# Patient Record
Sex: MALE | Race: WHITE | Employment: FULL TIME | URBAN - METROPOLITAN AREA
[De-identification: names, ages, dates, MRNs, and addresses within clinical notes are randomized per-mention and may not be internally consistent; named-entity substitution may affect disease eponyms.]

---

## 2017-01-23 RX ORDER — LISINOPRIL 40 MG/1
TABLET ORAL
Qty: 90 TABLET | Refills: 0 | Status: SHIPPED | OUTPATIENT
Start: 2017-01-23 | End: 2017-04-22

## 2017-04-22 RX ORDER — LISINOPRIL 40 MG/1
TABLET ORAL
Qty: 90 TABLET | Refills: 1 | Status: SHIPPED | OUTPATIENT
Start: 2017-04-22 | End: 2017-10-31

## 2017-05-25 ENCOUNTER — TELEPHONE (OUTPATIENT)
Dept: INTERNAL MEDICINE CLINIC | Facility: CLINIC | Age: 47
End: 2017-05-25

## 2017-05-25 DIAGNOSIS — Z00.00 ENCOUNTER FOR PREVENTATIVE ADULT HEALTH CARE EXAMINATION: Primary | ICD-10-CM

## 2017-05-25 NOTE — TELEPHONE ENCOUNTER
Patient has upcoming appointment for CPX  Future Appointments  Date Time Provider Vanessa Lock   6/1/2017 1:30 PM Charlie Hernandez MD [facility] [place]     Requesting to have blood orders entered prior to appointment  Please call with update

## 2017-05-25 NOTE — TELEPHONE ENCOUNTER
Orders placed, should get the blood work done 2-3 days before appointment, needs to be fasting for 8 hours (water and medications only).

## 2017-05-26 ENCOUNTER — APPOINTMENT (OUTPATIENT)
Dept: LAB | Age: 47
End: 2017-05-26
Attending: INTERNAL MEDICINE
Payer: COMMERCIAL

## 2017-05-26 DIAGNOSIS — Z00.00 ENCOUNTER FOR PREVENTATIVE ADULT HEALTH CARE EXAMINATION: ICD-10-CM

## 2017-05-26 PROCEDURE — 84153 ASSAY OF PSA TOTAL: CPT | Performed by: INTERNAL MEDICINE

## 2017-05-26 PROCEDURE — 80061 LIPID PANEL: CPT | Performed by: INTERNAL MEDICINE

## 2017-05-26 PROCEDURE — 80053 COMPREHEN METABOLIC PANEL: CPT | Performed by: INTERNAL MEDICINE

## 2017-05-26 PROCEDURE — 36415 COLL VENOUS BLD VENIPUNCTURE: CPT | Performed by: INTERNAL MEDICINE

## 2017-05-26 PROCEDURE — 83036 HEMOGLOBIN GLYCOSYLATED A1C: CPT | Performed by: INTERNAL MEDICINE

## 2017-06-01 ENCOUNTER — OFFICE VISIT (OUTPATIENT)
Dept: INTERNAL MEDICINE CLINIC | Facility: CLINIC | Age: 47
End: 2017-06-01

## 2017-06-01 VITALS
SYSTOLIC BLOOD PRESSURE: 124 MMHG | BODY MASS INDEX: 39.82 KG/M2 | HEIGHT: 72 IN | TEMPERATURE: 98 F | RESPIRATION RATE: 12 BRPM | DIASTOLIC BLOOD PRESSURE: 78 MMHG | HEART RATE: 80 BPM | WEIGHT: 294 LBS

## 2017-06-01 DIAGNOSIS — Z00.00 ENCOUNTER FOR PREVENTIVE HEALTH EXAMINATION: Primary | ICD-10-CM

## 2017-06-01 DIAGNOSIS — I10 ESSENTIAL HYPERTENSION: ICD-10-CM

## 2017-06-01 PROCEDURE — 99396 PREV VISIT EST AGE 40-64: CPT | Performed by: INTERNAL MEDICINE

## 2017-06-01 NOTE — PROGRESS NOTES
Krista Palacios is a 40year old male. HPI:   Patient presents with:  Physical  Patient presents for CPX/wellness examination. Had screening blood work done earlier in the week. Reasonable numbers. Admits to lapsing as far as diet and exercise.   Yogi mmHg  Pulse 80  Temp(Src) 98.2 °F (36.8 °C) (Oral)  Resp 12  Ht 72\"  Wt 294 lb  BMI 39.86 kg/m2  GENERAL: Alert and oriented, well developed, well nourished,in no apparent distress  HEENT: atraumatic, PERRLA, EOMI, normal lid and conjunctiva  LUNGS: clear

## 2017-06-01 NOTE — PATIENT INSTRUCTIONS
- Continue with lisinopril for blood pressure  - Try and cut down on dairy/starches to see if this helps with your bloating  - Follow up with ENT (Dr. Gregorio Greer) for your tonsils/sinuses. Follow up with us in 6 months for blood pressure.     It was a pleasu

## 2017-11-01 RX ORDER — LISINOPRIL 40 MG/1
TABLET ORAL
Qty: 90 TABLET | Refills: 1 | Status: SHIPPED | OUTPATIENT
Start: 2017-11-01 | End: 2018-05-03

## 2017-12-20 ENCOUNTER — CHARTING TRANS (OUTPATIENT)
Dept: OCCUPATIONAL MEDICINE | Age: 47
End: 2017-12-20

## 2017-12-27 ENCOUNTER — CHARTING TRANS (OUTPATIENT)
Dept: OCCUPATIONAL MEDICINE | Age: 47
End: 2017-12-27

## 2017-12-27 ENCOUNTER — TELEPHONE (OUTPATIENT)
Dept: INTERNAL MEDICINE CLINIC | Facility: CLINIC | Age: 47
End: 2017-12-27

## 2017-12-27 NOTE — TELEPHONE ENCOUNTER
Patient called b/p this AM @ work 152/90, patient denies any SOB,Headache,double vision or edema. Stress from Holidays? Patient offered several appointment but due to work schedule unable to accept, patient scheduled appointment for 1/6/18.  Patient instruc

## 2018-01-03 ENCOUNTER — CHARTING TRANS (OUTPATIENT)
Dept: OCCUPATIONAL MEDICINE | Age: 48
End: 2018-01-03

## 2018-01-06 ENCOUNTER — OFFICE VISIT (OUTPATIENT)
Dept: INTERNAL MEDICINE CLINIC | Facility: CLINIC | Age: 48
End: 2018-01-06

## 2018-01-06 VITALS
RESPIRATION RATE: 13 BRPM | DIASTOLIC BLOOD PRESSURE: 78 MMHG | TEMPERATURE: 98 F | OXYGEN SATURATION: 98 % | WEIGHT: 293 LBS | BODY MASS INDEX: 39.68 KG/M2 | HEART RATE: 64 BPM | SYSTOLIC BLOOD PRESSURE: 126 MMHG | HEIGHT: 72 IN

## 2018-01-06 DIAGNOSIS — E66.01 SEVERE OBESITY (BMI 35.0-39.9) WITH COMORBIDITY (HCC): ICD-10-CM

## 2018-01-06 DIAGNOSIS — M25.562 CHRONIC PAIN OF BOTH KNEES: ICD-10-CM

## 2018-01-06 DIAGNOSIS — M25.561 CHRONIC PAIN OF BOTH KNEES: ICD-10-CM

## 2018-01-06 DIAGNOSIS — I10 ESSENTIAL HYPERTENSION: Primary | ICD-10-CM

## 2018-01-06 DIAGNOSIS — G89.29 CHRONIC PAIN OF BOTH KNEES: ICD-10-CM

## 2018-01-06 PROCEDURE — 99214 OFFICE O/P EST MOD 30 MIN: CPT | Performed by: INTERNAL MEDICINE

## 2018-01-06 NOTE — PROGRESS NOTES
James Urban is a 56year old male. HPI:   Patient presents with: Follow - Up  Blood Pressure  Patient presents for follow up on chronic issues. He had an elevated blood pressure reading at work last month (825'N systolic).   He was under some stres Right arm, Patient Position: Sitting, Cuff Size: adult)   Pulse 64   Temp 98.3 °F (36.8 °C) (Oral)   Resp 13   Ht 72\"   Wt 293 lb   SpO2 98%   BMI 39.74 kg/m²   GENERAL: Alert and oriented, well developed, well nourished,in no apparent distress  HEENT: at

## 2018-01-06 NOTE — PATIENT INSTRUCTIONS
- We will continue your current dose of lisinopril. - Try and check your blood pressure in the evenings. If you are getting persistent blood pressures in the 150's, let us know. It was a pleasure seeing you in the clinic today.   Thank you for Button

## 2018-05-03 RX ORDER — LISINOPRIL 40 MG/1
TABLET ORAL
Qty: 90 TABLET | Refills: 0 | Status: SHIPPED | OUTPATIENT
Start: 2018-05-03 | End: 2018-08-02

## 2018-05-03 NOTE — TELEPHONE ENCOUNTER
Lisinopril 40 mg 1 tab daily filled 11-1-17 90 with 1 refill     LOV 1-6-18      return to clinic in 6 months     Labs 5-26-17     Normal/acceptable screening labs, repeat in 1 year.

## 2018-08-02 RX ORDER — LISINOPRIL 40 MG/1
TABLET ORAL
Qty: 90 TABLET | Refills: 0 | Status: SHIPPED | OUTPATIENT
Start: 2018-08-02 | End: 2018-08-16

## 2018-08-16 ENCOUNTER — OFFICE VISIT (OUTPATIENT)
Dept: INTERNAL MEDICINE CLINIC | Facility: CLINIC | Age: 48
End: 2018-08-16
Payer: COMMERCIAL

## 2018-08-16 ENCOUNTER — LAB ENCOUNTER (OUTPATIENT)
Dept: LAB | Age: 48
End: 2018-08-16
Attending: INTERNAL MEDICINE
Payer: COMMERCIAL

## 2018-08-16 VITALS
DIASTOLIC BLOOD PRESSURE: 72 MMHG | RESPIRATION RATE: 18 BRPM | TEMPERATURE: 99 F | BODY MASS INDEX: 32.7 KG/M2 | WEIGHT: 238.75 LBS | HEIGHT: 71.5 IN | OXYGEN SATURATION: 97 % | HEART RATE: 64 BPM | SYSTOLIC BLOOD PRESSURE: 118 MMHG

## 2018-08-16 DIAGNOSIS — G89.29 CHRONIC PAIN OF BOTH KNEES: ICD-10-CM

## 2018-08-16 DIAGNOSIS — M25.561 CHRONIC PAIN OF BOTH KNEES: ICD-10-CM

## 2018-08-16 DIAGNOSIS — G89.29 CHRONIC RIGHT-SIDED THORACIC BACK PAIN: ICD-10-CM

## 2018-08-16 DIAGNOSIS — Z00.00 ENCOUNTER FOR PREVENTATIVE ADULT HEALTH CARE EXAMINATION: ICD-10-CM

## 2018-08-16 DIAGNOSIS — M25.562 CHRONIC PAIN OF BOTH KNEES: ICD-10-CM

## 2018-08-16 DIAGNOSIS — Z00.00 ENCOUNTER FOR PREVENTATIVE ADULT HEALTH CARE EXAMINATION: Primary | ICD-10-CM

## 2018-08-16 DIAGNOSIS — M54.6 CHRONIC RIGHT-SIDED THORACIC BACK PAIN: ICD-10-CM

## 2018-08-16 DIAGNOSIS — I10 ESSENTIAL HYPERTENSION: ICD-10-CM

## 2018-08-16 LAB
ALBUMIN SERPL-MCNC: 4 G/DL (ref 3.5–4.8)
ALBUMIN/GLOB SERPL: 1.2 {RATIO} (ref 1–2)
ALP LIVER SERPL-CCNC: 61 U/L (ref 45–117)
ALT SERPL-CCNC: 25 U/L (ref 17–63)
ANION GAP SERPL CALC-SCNC: 7 MMOL/L (ref 0–18)
AST SERPL-CCNC: 18 U/L (ref 15–41)
BASOPHILS # BLD AUTO: 0.03 X10(3) UL (ref 0–0.1)
BASOPHILS NFR BLD AUTO: 0.6 %
BILIRUB SERPL-MCNC: 0.6 MG/DL (ref 0.1–2)
BUN BLD-MCNC: 12 MG/DL (ref 8–20)
BUN/CREAT SERPL: 12 (ref 10–20)
CALCIUM BLD-MCNC: 9 MG/DL (ref 8.3–10.3)
CHLORIDE SERPL-SCNC: 109 MMOL/L (ref 101–111)
CHOLEST SMN-MCNC: 156 MG/DL (ref ?–200)
CO2 SERPL-SCNC: 27 MMOL/L (ref 22–32)
COMPLEXED PSA SERPL-MCNC: 0.65 NG/ML (ref 0.01–4)
CREAT BLD-MCNC: 1 MG/DL (ref 0.7–1.3)
EOSINOPHIL # BLD AUTO: 0.09 X10(3) UL (ref 0–0.3)
EOSINOPHIL NFR BLD AUTO: 1.8 %
ERYTHROCYTE [DISTWIDTH] IN BLOOD BY AUTOMATED COUNT: 14 % (ref 11.5–16)
EST. AVERAGE GLUCOSE BLD GHB EST-MCNC: 108 MG/DL (ref 68–126)
GLOBULIN PLAS-MCNC: 3.3 G/DL (ref 2.5–4)
GLUCOSE BLD-MCNC: 93 MG/DL (ref 70–99)
HBA1C MFR BLD HPLC: 5.4 % (ref ?–5.7)
HCT VFR BLD AUTO: 43.4 % (ref 37–53)
HDLC SERPL-MCNC: 50 MG/DL (ref 40–59)
HGB BLD-MCNC: 14 G/DL (ref 13–17)
IMMATURE GRANULOCYTE COUNT: 0.01 X10(3) UL (ref 0–1)
IMMATURE GRANULOCYTE RATIO %: 0.2 %
LDLC SERPL CALC-MCNC: 96 MG/DL (ref ?–100)
LYMPHOCYTES # BLD AUTO: 1.74 X10(3) UL (ref 0.9–4)
LYMPHOCYTES NFR BLD AUTO: 35.4 %
M PROTEIN MFR SERPL ELPH: 7.3 G/DL (ref 6.1–8.3)
MCH RBC QN AUTO: 30.4 PG (ref 27–33.2)
MCHC RBC AUTO-ENTMCNC: 32.3 G/DL (ref 31–37)
MCV RBC AUTO: 94.3 FL (ref 80–99)
MONOCYTES # BLD AUTO: 0.4 X10(3) UL (ref 0.1–1)
MONOCYTES NFR BLD AUTO: 8.1 %
NEUTROPHIL ABS PRELIM: 2.65 X10 (3) UL (ref 1.3–6.7)
NEUTROPHILS # BLD AUTO: 2.65 X10(3) UL (ref 1.3–6.7)
NEUTROPHILS NFR BLD AUTO: 53.9 %
NONHDLC SERPL-MCNC: 106 MG/DL (ref ?–130)
OSMOLALITY SERPL CALC.SUM OF ELEC: 295 MOSM/KG (ref 275–295)
PLATELET # BLD AUTO: 222 10(3)UL (ref 150–450)
POTASSIUM SERPL-SCNC: 4.9 MMOL/L (ref 3.6–5.1)
RBC # BLD AUTO: 4.6 X10(6)UL (ref 4.3–5.7)
RED CELL DISTRIBUTION WIDTH-SD: 48.4 FL (ref 35.1–46.3)
RHEUMATOID FACT SERPL-ACNC: <10 IU/ML (ref ?–15)
SODIUM SERPL-SCNC: 143 MMOL/L (ref 136–144)
TRIGL SERPL-MCNC: 48 MG/DL (ref 30–149)
TSI SER-ACNC: 0.6 MIU/ML (ref 0.35–5.5)
VLDLC SERPL CALC-MCNC: 10 MG/DL (ref 0–30)
WBC # BLD AUTO: 4.9 X10(3) UL (ref 4–13)

## 2018-08-16 PROCEDURE — 86038 ANTINUCLEAR ANTIBODIES: CPT | Performed by: INTERNAL MEDICINE

## 2018-08-16 PROCEDURE — 80050 GENERAL HEALTH PANEL: CPT | Performed by: INTERNAL MEDICINE

## 2018-08-16 PROCEDURE — 83036 HEMOGLOBIN GLYCOSYLATED A1C: CPT | Performed by: INTERNAL MEDICINE

## 2018-08-16 PROCEDURE — 99396 PREV VISIT EST AGE 40-64: CPT | Performed by: INTERNAL MEDICINE

## 2018-08-16 PROCEDURE — 86431 RHEUMATOID FACTOR QUANT: CPT | Performed by: INTERNAL MEDICINE

## 2018-08-16 PROCEDURE — 80061 LIPID PANEL: CPT | Performed by: INTERNAL MEDICINE

## 2018-08-16 PROCEDURE — 86200 CCP ANTIBODY: CPT | Performed by: INTERNAL MEDICINE

## 2018-08-16 PROCEDURE — 36415 COLL VENOUS BLD VENIPUNCTURE: CPT | Performed by: INTERNAL MEDICINE

## 2018-08-16 PROCEDURE — 84153 ASSAY OF PSA TOTAL: CPT | Performed by: INTERNAL MEDICINE

## 2018-08-16 RX ORDER — NAPROXEN 250 MG/1
250 TABLET ORAL DAILY PRN
COMMUNITY
End: 2019-11-22 | Stop reason: ALTCHOICE

## 2018-08-16 NOTE — PROGRESS NOTES
Corbin Mock is a 56year old male. HPI:   Patient presents with:  Physical: Fasting  Blood Pressure: Pt dc'd Lisinopril - Lost 60+ lbs and notice bp was getting low. Was feeling lightheaded. Patient presents for CPX/wellness exanation.   Diet:  A lot daily as needed. , Disp: 45 tablet, Rfl: 0  •  IBUPROFEN 800 MG Oral Tab, Take 1 tablet (800 mg total) by mouth 3 (three) times daily.  (Patient taking differently: Take 800 mg by mouth daily as needed.  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 Advised to work it in on the weekend/when he is off work. 2.  Essential hypertension  After his significant weight loss, started feeling dizzy/lightheaded.   Checked blood pressure and found he

## 2018-08-16 NOTE — PATIENT INSTRUCTIONS
- Get blood work done today  - If your autoimmune tests are positive, we will have you see a rheumatologist.  Otherwise, you can follow up with Orthopedics (Dr. Liana Gtz).    - If your knee starts swelling again, you can take the prescription anti-inflammatory

## 2018-08-17 LAB — ANA SCREEN: NEGATIVE

## 2018-08-18 LAB — CYCLIC CITRULLINATED PEPTIDE: 5 UNITS

## 2018-11-27 VITALS
RESPIRATION RATE: 16 BRPM | HEART RATE: 71 BPM | TEMPERATURE: 98.2 F | SYSTOLIC BLOOD PRESSURE: 141 MMHG | DIASTOLIC BLOOD PRESSURE: 87 MMHG

## 2018-11-27 VITALS
WEIGHT: 280 LBS | HEIGHT: 72 IN | HEART RATE: 79 BPM | TEMPERATURE: 97.9 F | DIASTOLIC BLOOD PRESSURE: 99 MMHG | RESPIRATION RATE: 18 BRPM | SYSTOLIC BLOOD PRESSURE: 148 MMHG | BODY MASS INDEX: 37.93 KG/M2

## 2018-11-27 VITALS
SYSTOLIC BLOOD PRESSURE: 152 MMHG | DIASTOLIC BLOOD PRESSURE: 90 MMHG | HEART RATE: 81 BPM | RESPIRATION RATE: 16 BRPM | TEMPERATURE: 97.7 F

## 2018-12-20 ENCOUNTER — IMMUNIZATION (OUTPATIENT)
Dept: INTERNAL MEDICINE CLINIC | Facility: CLINIC | Age: 48
End: 2018-12-20
Payer: COMMERCIAL

## 2018-12-20 DIAGNOSIS — Z23 NEED FOR VACCINATION: ICD-10-CM

## 2018-12-20 PROCEDURE — 90471 IMMUNIZATION ADMIN: CPT | Performed by: INTERNAL MEDICINE

## 2018-12-20 PROCEDURE — 90686 IIV4 VACC NO PRSV 0.5 ML IM: CPT | Performed by: INTERNAL MEDICINE

## 2019-11-22 ENCOUNTER — LAB ENCOUNTER (OUTPATIENT)
Dept: LAB | Age: 49
End: 2019-11-22
Attending: INTERNAL MEDICINE
Payer: COMMERCIAL

## 2019-11-22 ENCOUNTER — OFFICE VISIT (OUTPATIENT)
Dept: INTERNAL MEDICINE CLINIC | Facility: CLINIC | Age: 49
End: 2019-11-22
Payer: COMMERCIAL

## 2019-11-22 VITALS
BODY MASS INDEX: 33.34 KG/M2 | WEIGHT: 243.5 LBS | HEART RATE: 64 BPM | RESPIRATION RATE: 16 BRPM | SYSTOLIC BLOOD PRESSURE: 124 MMHG | TEMPERATURE: 98 F | DIASTOLIC BLOOD PRESSURE: 74 MMHG | HEIGHT: 71.75 IN

## 2019-11-22 DIAGNOSIS — Z00.00 ENCOUNTER FOR PREVENTATIVE ADULT HEALTH CARE EXAMINATION: ICD-10-CM

## 2019-11-22 DIAGNOSIS — Z12.5 SCREENING FOR MALIGNANT NEOPLASM OF PROSTATE: ICD-10-CM

## 2019-11-22 DIAGNOSIS — Z00.00 ENCOUNTER FOR PREVENTATIVE ADULT HEALTH CARE EXAMINATION: Primary | ICD-10-CM

## 2019-11-22 DIAGNOSIS — Z23 NEED FOR INFLUENZA VACCINATION: ICD-10-CM

## 2019-11-22 PROCEDURE — 99396 PREV VISIT EST AGE 40-64: CPT | Performed by: INTERNAL MEDICINE

## 2019-11-22 PROCEDURE — 36415 COLL VENOUS BLD VENIPUNCTURE: CPT | Performed by: INTERNAL MEDICINE

## 2019-11-22 PROCEDURE — 80050 GENERAL HEALTH PANEL: CPT | Performed by: INTERNAL MEDICINE

## 2019-11-22 PROCEDURE — 90471 IMMUNIZATION ADMIN: CPT | Performed by: INTERNAL MEDICINE

## 2019-11-22 PROCEDURE — 90686 IIV4 VACC NO PRSV 0.5 ML IM: CPT | Performed by: INTERNAL MEDICINE

## 2019-11-22 PROCEDURE — 84153 ASSAY OF PSA TOTAL: CPT | Performed by: INTERNAL MEDICINE

## 2019-11-22 PROCEDURE — 83036 HEMOGLOBIN GLYCOSYLATED A1C: CPT | Performed by: INTERNAL MEDICINE

## 2019-11-22 PROCEDURE — 80061 LIPID PANEL: CPT | Performed by: INTERNAL MEDICINE

## 2019-11-22 NOTE — PATIENT INSTRUCTIONS
- Goal weight in the low 230's for now  - Continue with healthy diet and regular exercise  - Blood pressure looks great  - Right ear cleaned out today.     - Flu shot given today  - Consider water exercises/water aerobics for your chronic joint pains.  - Fo

## 2019-11-22 NOTE — PROGRESS NOTES
Srinivas Gonzalez is a 53year old male. HPI:   Patient presents with:  Physical: Fasting; Patient presents for CPX/wellness examination. Diet: Similar diet - still watching diet, low carbs, fresh foods. Exercise: Going to the gym 2-3 times a week. /74 (BP Location: Left arm, Patient Position: Sitting, Cuff Size: adult)   Pulse 64   Temp 98.4 °F (36.9 °C) (Oral)   Resp 16   Ht 71.75\"   Wt 243 lb 8 oz (110.5 kg)   BMI 33.26 kg/m²   GENERAL: Alert and oriented, well developed, well nourished,i these issues and agrees to the plan. The patient is asked to return to clinic in 12 months with myself for follow up on chronic issues/CPX, or earlier if acute issues arise.     Cristobal Gatica MD

## 2020-11-06 ENCOUNTER — APPOINTMENT (OUTPATIENT)
Dept: GENERAL RADIOLOGY | Facility: HOSPITAL | Age: 50
End: 2020-11-06
Payer: COMMERCIAL

## 2020-11-06 ENCOUNTER — APPOINTMENT (OUTPATIENT)
Dept: CV DIAGNOSTICS | Facility: HOSPITAL | Age: 50
End: 2020-11-06
Attending: EMERGENCY MEDICINE
Payer: COMMERCIAL

## 2020-11-06 ENCOUNTER — HOSPITAL ENCOUNTER (EMERGENCY)
Facility: HOSPITAL | Age: 50
Discharge: HOME | End: 2020-11-06
Attending: EMERGENCY MEDICINE
Payer: COMMERCIAL

## 2020-11-06 VITALS
RESPIRATION RATE: 16 BRPM | OXYGEN SATURATION: 96 % | HEART RATE: 70 BPM | SYSTOLIC BLOOD PRESSURE: 134 MMHG | BODY MASS INDEX: 36 KG/M2 | DIASTOLIC BLOOD PRESSURE: 96 MMHG | TEMPERATURE: 99 F | WEIGHT: 260 LBS

## 2020-11-06 DIAGNOSIS — K20.90 ESOPHAGITIS: ICD-10-CM

## 2020-11-06 DIAGNOSIS — R07.9 CHEST PAIN OF UNCERTAIN ETIOLOGY: Primary | ICD-10-CM

## 2020-11-06 PROCEDURE — 36415 COLL VENOUS BLD VENIPUNCTURE: CPT

## 2020-11-06 PROCEDURE — 85025 COMPLETE CBC W/AUTO DIFF WBC: CPT | Performed by: EMERGENCY MEDICINE

## 2020-11-06 PROCEDURE — 83690 ASSAY OF LIPASE: CPT | Performed by: EMERGENCY MEDICINE

## 2020-11-06 PROCEDURE — 84484 ASSAY OF TROPONIN QUANT: CPT | Performed by: EMERGENCY MEDICINE

## 2020-11-06 PROCEDURE — 93350 STRESS TTE ONLY: CPT | Performed by: EMERGENCY MEDICINE

## 2020-11-06 PROCEDURE — 80053 COMPREHEN METABOLIC PANEL: CPT | Performed by: EMERGENCY MEDICINE

## 2020-11-06 PROCEDURE — 80053 COMPREHEN METABOLIC PANEL: CPT

## 2020-11-06 PROCEDURE — 99285 EMERGENCY DEPT VISIT HI MDM: CPT

## 2020-11-06 PROCEDURE — 93018 CV STRESS TEST I&R ONLY: CPT | Performed by: EMERGENCY MEDICINE

## 2020-11-06 PROCEDURE — 85025 COMPLETE CBC W/AUTO DIFF WBC: CPT

## 2020-11-06 PROCEDURE — 71045 X-RAY EXAM CHEST 1 VIEW: CPT

## 2020-11-06 PROCEDURE — 84484 ASSAY OF TROPONIN QUANT: CPT

## 2020-11-06 PROCEDURE — 93005 ELECTROCARDIOGRAM TRACING: CPT

## 2020-11-06 PROCEDURE — 93017 CV STRESS TEST TRACING ONLY: CPT | Performed by: EMERGENCY MEDICINE

## 2020-11-06 PROCEDURE — 93010 ELECTROCARDIOGRAM REPORT: CPT

## 2020-11-06 RX ORDER — MAGNESIUM HYDROXIDE/ALUMINUM HYDROXICE/SIMETHICONE 120; 1200; 1200 MG/30ML; MG/30ML; MG/30ML
30 SUSPENSION ORAL ONCE
Status: COMPLETED | OUTPATIENT
Start: 2020-11-06 | End: 2020-11-06

## 2020-11-06 RX ORDER — ASPIRIN 81 MG/1
TABLET, CHEWABLE ORAL
Status: DISCONTINUED
Start: 2020-11-06 | End: 2020-11-06 | Stop reason: WASHOUT

## 2020-11-06 RX ORDER — ASPIRIN 81 MG/1
324 TABLET, CHEWABLE ORAL ONCE
Status: COMPLETED | OUTPATIENT
Start: 2020-11-06 | End: 2020-11-06

## 2020-11-06 RX ORDER — LIDOCAINE HYDROCHLORIDE 20 MG/ML
10 SOLUTION OROPHARYNGEAL ONCE
Status: COMPLETED | OUTPATIENT
Start: 2020-11-06 | End: 2020-11-06

## 2020-11-06 RX ORDER — PANTOPRAZOLE SODIUM 40 MG/1
40 TABLET, DELAYED RELEASE ORAL DAILY
Qty: 30 TABLET | Refills: 0 | Status: SHIPPED | OUTPATIENT
Start: 2020-11-06 | End: 2020-11-27

## 2020-11-06 NOTE — ED PROVIDER NOTES
Patient Seen in: [facility] Emergency Department      History   Patient presents with:  Chest Pain Angina    Stated Complaint: chest pain    HPI    45-year-old male presents to the emergency department with complaints of chest pain.   Patient states Constitutional:       General: He is not in acute distress. Appearance: Normal appearance. He is well-developed. He is obese. HENT:      Head: Normocephalic and atraumatic. Neck:      Musculoskeletal: Normal range of motion and neck supple.    Car transcribed by Technologist)  Patient offered no additional history at this time. FINDINGS:  Heart size is within normal limits. Pleural spaces appear clear. Mediastinal and hilar contours are normal.  No focal consolidation.             CONCLUSION:  N diagnosis)  Esophagitis    Disposition:  Discharge  11/6/2020  2:07 pm    Follow-up:  Susy Owen MD  [address] [place] [address]  [phone]    Schedule an appointment as soon as possible for a visit      Jayleen Wright MD  [address] [place] [address]

## 2020-11-06 NOTE — PROGRESS NOTES
[facility] preliminary ER stress echo:  Patient walked 7:20 on Pola protocol, achieving greater than 90% heartrate and no ST changes noted on EKG. Occasional PVC's in recovery noted.   Patient verbalized mild chest tightness 2/10 with exercise, r

## 2020-11-27 ENCOUNTER — LAB ENCOUNTER (OUTPATIENT)
Dept: LAB | Age: 50
End: 2020-11-27
Attending: INTERNAL MEDICINE
Payer: COMMERCIAL

## 2020-11-27 ENCOUNTER — OFFICE VISIT (OUTPATIENT)
Dept: INTERNAL MEDICINE CLINIC | Facility: CLINIC | Age: 50
End: 2020-11-27
Payer: COMMERCIAL

## 2020-11-27 VITALS
RESPIRATION RATE: 16 BRPM | HEIGHT: 71.5 IN | WEIGHT: 265.19 LBS | SYSTOLIC BLOOD PRESSURE: 136 MMHG | DIASTOLIC BLOOD PRESSURE: 80 MMHG | TEMPERATURE: 98 F | BODY MASS INDEX: 36.32 KG/M2 | HEART RATE: 68 BPM

## 2020-11-27 DIAGNOSIS — R07.89 ATYPICAL CHEST PAIN: ICD-10-CM

## 2020-11-27 DIAGNOSIS — Z82.49 FAMILY HISTORY OF CORONARY ARTERY DISEASE IN FATHER: ICD-10-CM

## 2020-11-27 DIAGNOSIS — Z12.5 SCREENING FOR MALIGNANT NEOPLASM OF PROSTATE: ICD-10-CM

## 2020-11-27 DIAGNOSIS — Z00.00 ENCOUNTER FOR PREVENTATIVE ADULT HEALTH CARE EXAMINATION: Primary | ICD-10-CM

## 2020-11-27 DIAGNOSIS — Z00.00 ENCOUNTER FOR PREVENTATIVE ADULT HEALTH CARE EXAMINATION: ICD-10-CM

## 2020-11-27 DIAGNOSIS — I10 ESSENTIAL HYPERTENSION: ICD-10-CM

## 2020-11-27 PROCEDURE — 36415 COLL VENOUS BLD VENIPUNCTURE: CPT

## 2020-11-27 PROCEDURE — 3008F BODY MASS INDEX DOCD: CPT | Performed by: INTERNAL MEDICINE

## 2020-11-27 PROCEDURE — 99072 ADDL SUPL MATRL&STAF TM PHE: CPT | Performed by: INTERNAL MEDICINE

## 2020-11-27 PROCEDURE — 84443 ASSAY THYROID STIM HORMONE: CPT

## 2020-11-27 PROCEDURE — 80061 LIPID PANEL: CPT

## 2020-11-27 PROCEDURE — 3079F DIAST BP 80-89 MM HG: CPT | Performed by: INTERNAL MEDICINE

## 2020-11-27 PROCEDURE — 83036 HEMOGLOBIN GLYCOSYLATED A1C: CPT

## 2020-11-27 PROCEDURE — 3075F SYST BP GE 130 - 139MM HG: CPT | Performed by: INTERNAL MEDICINE

## 2020-11-27 PROCEDURE — 99396 PREV VISIT EST AGE 40-64: CPT | Performed by: INTERNAL MEDICINE

## 2020-11-27 RX ORDER — PANTOPRAZOLE SODIUM 40 MG/1
40 TABLET, DELAYED RELEASE ORAL DAILY
Qty: 90 TABLET | Refills: 0 | Status: SHIPPED | OUTPATIENT
Start: 2020-11-27 | End: 2022-01-08 | Stop reason: ALTCHOICE

## 2020-11-27 NOTE — PATIENT INSTRUCTIONS
- Get fasting blood work done today.   We will not repeat the blood counts and metabolic panel (kidney/liver/electrolytes) tests as those were normal in the emergency department  - Continue focus on healthy diet and regular exercise/weight loss to help bloo

## 2020-11-27 NOTE — PROGRESS NOTES
Joshua Lowery is a 51year old male. HPI:   Patient presents with:  Physical  ER F/U    Patient presents for CPX/wellness examination. Diet: Not as great as last year. Exercise: Not as much exercise with gyms being closed.   Vision: Reading glasses on maternal grandfather; hodgkin's disease in his mother; lung cancer in his maternal grandmother; valvular heart disease in his father. Social:  reports that he has quit smoking. His smoking use included cigarettes. He quit after 2.00 years of use.  He has n child.  - LIPID PANEL; Future  - HEMOGLOBIN A1C; Future  - TSH W REFLEX TO FREE T4; Future  - PSA SCREEN; Future    3. Essential hypertension  Repeat reading at goal.  Lifestyle controlled after he lost a lot of weight previously.   Initial reading borderli

## 2022-01-08 ENCOUNTER — OFFICE VISIT (OUTPATIENT)
Dept: INTERNAL MEDICINE CLINIC | Facility: CLINIC | Age: 52
End: 2022-01-08
Payer: COMMERCIAL

## 2022-01-08 ENCOUNTER — LAB ENCOUNTER (OUTPATIENT)
Dept: LAB | Age: 52
End: 2022-01-08
Attending: INTERNAL MEDICINE
Payer: COMMERCIAL

## 2022-01-08 VITALS
BODY MASS INDEX: 38.29 KG/M2 | OXYGEN SATURATION: 99 % | HEART RATE: 79 BPM | TEMPERATURE: 98 F | HEIGHT: 71.5 IN | RESPIRATION RATE: 16 BRPM | DIASTOLIC BLOOD PRESSURE: 70 MMHG | WEIGHT: 279.63 LBS | SYSTOLIC BLOOD PRESSURE: 130 MMHG

## 2022-01-08 DIAGNOSIS — M25.50 MULTIPLE JOINT PAIN: ICD-10-CM

## 2022-01-08 DIAGNOSIS — Z82.49 FAMILY HISTORY OF CORONARY ARTERY DISEASE IN FATHER: ICD-10-CM

## 2022-01-08 DIAGNOSIS — R41.840 CONCENTRATION DEFICIT: ICD-10-CM

## 2022-01-08 DIAGNOSIS — Z12.5 SCREENING FOR MALIGNANT NEOPLASM OF PROSTATE: ICD-10-CM

## 2022-01-08 DIAGNOSIS — Z00.00 ENCOUNTER FOR PREVENTATIVE ADULT HEALTH CARE EXAMINATION: Primary | ICD-10-CM

## 2022-01-08 DIAGNOSIS — Z00.00 ENCOUNTER FOR PREVENTATIVE ADULT HEALTH CARE EXAMINATION: ICD-10-CM

## 2022-01-08 DIAGNOSIS — Z63.4 BEREAVEMENT: ICD-10-CM

## 2022-01-08 DIAGNOSIS — I10 ESSENTIAL HYPERTENSION: ICD-10-CM

## 2022-01-08 DIAGNOSIS — Z12.11 SCREENING FOR MALIGNANT NEOPLASM OF COLON: ICD-10-CM

## 2022-01-08 LAB
ALBUMIN SERPL-MCNC: 4.2 G/DL (ref 3.4–5)
ALBUMIN/GLOB SERPL: 1.3 {RATIO} (ref 1–2)
ALP LIVER SERPL-CCNC: 65 U/L
ALT SERPL-CCNC: 39 U/L
ANION GAP SERPL CALC-SCNC: 4 MMOL/L (ref 0–18)
AST SERPL-CCNC: 30 U/L (ref 15–37)
BASOPHILS # BLD AUTO: 0.02 X10(3) UL (ref 0–0.2)
BASOPHILS NFR BLD AUTO: 0.4 %
BILIRUB SERPL-MCNC: 0.5 MG/DL (ref 0.1–2)
BUN BLD-MCNC: 12 MG/DL (ref 7–18)
CALCIUM BLD-MCNC: 9.2 MG/DL (ref 8.5–10.1)
CHLORIDE SERPL-SCNC: 111 MMOL/L (ref 98–112)
CHOLEST SERPL-MCNC: 200 MG/DL (ref ?–200)
CO2 SERPL-SCNC: 27 MMOL/L (ref 21–32)
COMPLEXED PSA SERPL-MCNC: 0.95 NG/ML (ref ?–4)
CREAT BLD-MCNC: 0.87 MG/DL
EOSINOPHIL # BLD AUTO: 0.13 X10(3) UL (ref 0–0.7)
EOSINOPHIL NFR BLD AUTO: 2.7 %
ERYTHROCYTE [DISTWIDTH] IN BLOOD BY AUTOMATED COUNT: 13.4 %
EST. AVERAGE GLUCOSE BLD GHB EST-MCNC: 111 MG/DL (ref 68–126)
FASTING PATIENT LIPID ANSWER: YES
FASTING STATUS PATIENT QL REPORTED: YES
GLOBULIN PLAS-MCNC: 3.2 G/DL (ref 2.8–4.4)
GLUCOSE BLD-MCNC: 94 MG/DL (ref 70–99)
HBA1C MFR BLD: 5.5 % (ref ?–5.7)
HCT VFR BLD AUTO: 47.2 %
HDLC SERPL-MCNC: 51 MG/DL (ref 40–59)
HGB BLD-MCNC: 15 G/DL
IMM GRANULOCYTES # BLD AUTO: 0.01 X10(3) UL (ref 0–1)
IMM GRANULOCYTES NFR BLD: 0.2 %
LDLC SERPL CALC-MCNC: 140 MG/DL (ref ?–100)
LYMPHOCYTES # BLD AUTO: 2.12 X10(3) UL (ref 1–4)
LYMPHOCYTES NFR BLD AUTO: 43.4 %
MCH RBC QN AUTO: 29.2 PG (ref 26–34)
MCHC RBC AUTO-ENTMCNC: 31.8 G/DL (ref 31–37)
MCV RBC AUTO: 92 FL
MONOCYTES # BLD AUTO: 0.51 X10(3) UL (ref 0.1–1)
MONOCYTES NFR BLD AUTO: 10.5 %
NEUTROPHILS # BLD AUTO: 2.09 X10 (3) UL (ref 1.5–7.7)
NEUTROPHILS # BLD AUTO: 2.09 X10(3) UL (ref 1.5–7.7)
NEUTROPHILS NFR BLD AUTO: 42.8 %
NONHDLC SERPL-MCNC: 149 MG/DL (ref ?–130)
OSMOLALITY SERPL CALC.SUM OF ELEC: 294 MOSM/KG (ref 275–295)
PLATELET # BLD AUTO: 255 10(3)UL (ref 150–450)
POTASSIUM SERPL-SCNC: 4.8 MMOL/L (ref 3.5–5.1)
PROT SERPL-MCNC: 7.4 G/DL (ref 6.4–8.2)
RBC # BLD AUTO: 5.13 X10(6)UL
SODIUM SERPL-SCNC: 142 MMOL/L (ref 136–145)
TRIGL SERPL-MCNC: 51 MG/DL (ref 30–149)
TSI SER-ACNC: 0.69 MIU/ML (ref 0.36–3.74)
VLDLC SERPL CALC-MCNC: 9 MG/DL (ref 0–30)
WBC # BLD AUTO: 4.9 X10(3) UL (ref 4–11)

## 2022-01-08 PROCEDURE — 80061 LIPID PANEL: CPT

## 2022-01-08 PROCEDURE — 85025 COMPLETE CBC W/AUTO DIFF WBC: CPT

## 2022-01-08 PROCEDURE — 3078F DIAST BP <80 MM HG: CPT | Performed by: INTERNAL MEDICINE

## 2022-01-08 PROCEDURE — 83036 HEMOGLOBIN GLYCOSYLATED A1C: CPT

## 2022-01-08 PROCEDURE — 84443 ASSAY THYROID STIM HORMONE: CPT

## 2022-01-08 PROCEDURE — 36415 COLL VENOUS BLD VENIPUNCTURE: CPT

## 2022-01-08 PROCEDURE — 3075F SYST BP GE 130 - 139MM HG: CPT | Performed by: INTERNAL MEDICINE

## 2022-01-08 PROCEDURE — 3008F BODY MASS INDEX DOCD: CPT | Performed by: INTERNAL MEDICINE

## 2022-01-08 PROCEDURE — 99396 PREV VISIT EST AGE 40-64: CPT | Performed by: INTERNAL MEDICINE

## 2022-01-08 PROCEDURE — 80053 COMPREHEN METABOLIC PANEL: CPT

## 2022-01-08 SDOH — SOCIAL STABILITY - SOCIAL INSECURITY: DISSAPEARANCE AND DEATH OF FAMILY MEMBER: Z63.4

## 2022-01-08 NOTE — PROGRESS NOTES
Parviz Wheeler is a 49year old male. HPI:   Patient presents with:  Physical: Due for colonoscopy     Patient presents for CPX/wellness examination. Diet: Not the best diet of late. Exercise: Not as much as before.   Vision: Up to date  Dental: Up to drugs.   Wt Readings from Last 6 Encounters:  01/08/22 : 279 lb 9.6 oz (126.8 kg)  11/27/20 : 265 lb 3.2 oz (120.3 kg)  11/06/20 : 260 lb (117.9 kg)  11/22/19 : 243 lb 8 oz (110.5 kg)  08/16/18 : 238 lb 12 oz (108.3 kg)  01/06/18 : 293 lb (132.9 kg)    EXAM worsening pain in multiple joints. Will have patient follow up with Rheumatology.    - RHEUMATOLOGY - INTERNAL    7. Bereavement  Father passed away recently after going through hospice. Patient still grieving. I referral entered.   - OP REFERRAL TO [facility]

## 2022-01-08 NOTE — PATIENT INSTRUCTIONS
- Get blood tests done today  - Blood pressure looks good. Will continue to monitor.  - Follow up with our Rheumatologists (Dr. Gordon Catalan, [id number] [id number], and [facility]) for your joint pains. Information on next page.   - Follow up with Dr. Andreina Garcia for you

## 2022-03-11 ENCOUNTER — LAB ENCOUNTER (OUTPATIENT)
Dept: LAB | Age: 52
End: 2022-03-11
Attending: INTERNAL MEDICINE
Payer: COMMERCIAL

## 2022-03-11 ENCOUNTER — OFFICE VISIT (OUTPATIENT)
Dept: RHEUMATOLOGY | Facility: CLINIC | Age: 52
End: 2022-03-11
Payer: COMMERCIAL

## 2022-03-11 ENCOUNTER — HOSPITAL ENCOUNTER (OUTPATIENT)
Dept: CT IMAGING | Age: 52
Discharge: HOME | End: 2022-03-11
Attending: INTERNAL MEDICINE

## 2022-03-11 VITALS
OXYGEN SATURATION: 97 % | WEIGHT: 260 LBS | HEART RATE: 66 BPM | SYSTOLIC BLOOD PRESSURE: 130 MMHG | DIASTOLIC BLOOD PRESSURE: 81 MMHG | HEIGHT: 72 IN | BODY MASS INDEX: 35.21 KG/M2

## 2022-03-11 DIAGNOSIS — M79.642 BILATERAL HAND PAIN: ICD-10-CM

## 2022-03-11 DIAGNOSIS — M79.641 BILATERAL HAND PAIN: ICD-10-CM

## 2022-03-11 DIAGNOSIS — Z51.81 THERAPEUTIC DRUG MONITORING: ICD-10-CM

## 2022-03-11 DIAGNOSIS — M25.531 RIGHT WRIST PAIN: ICD-10-CM

## 2022-03-11 DIAGNOSIS — M67.30 TRANSIENT SYNOVITIS: ICD-10-CM

## 2022-03-11 DIAGNOSIS — M25.521 RIGHT ELBOW PAIN: ICD-10-CM

## 2022-03-11 DIAGNOSIS — Z13.9 SCREENING PROCEDURE: ICD-10-CM

## 2022-03-11 DIAGNOSIS — M67.30 TRANSIENT SYNOVITIS: Primary | ICD-10-CM

## 2022-03-11 PROBLEM — M17.12 OSTEOARTHRITIS OF LEFT KNEE, UNSPECIFIED OSTEOARTHRITIS TYPE: Status: ACTIVE | Noted: 2022-03-11

## 2022-03-11 PROBLEM — S83.207A TEAR OF MENISCUS OF LEFT KNEE, UNSPECIFIED MENISCUS, UNSPECIFIED TEAR TYPE, UNSPECIFIED WHETHER OLD OR CURRENT TEAR: Status: ACTIVE | Noted: 2022-03-11

## 2022-03-11 LAB
RHEUMATOID FACT SERPL-ACNC: <10 IU/ML (ref ?–15)
URATE SERPL-MCNC: 5.8 MG/DL

## 2022-03-11 PROCEDURE — 84550 ASSAY OF BLOOD/URIC ACID: CPT | Performed by: INTERNAL MEDICINE

## 2022-03-11 PROCEDURE — 99244 OFF/OP CNSLTJ NEW/EST MOD 40: CPT | Performed by: INTERNAL MEDICINE

## 2022-03-11 PROCEDURE — 86200 CCP ANTIBODY: CPT | Performed by: INTERNAL MEDICINE

## 2022-03-11 PROCEDURE — 86431 RHEUMATOID FACTOR QUANT: CPT | Performed by: INTERNAL MEDICINE

## 2022-03-11 PROCEDURE — 3079F DIAST BP 80-89 MM HG: CPT | Performed by: INTERNAL MEDICINE

## 2022-03-11 PROCEDURE — 3075F SYST BP GE 130 - 139MM HG: CPT | Performed by: INTERNAL MEDICINE

## 2022-03-11 PROCEDURE — 3008F BODY MASS INDEX DOCD: CPT | Performed by: INTERNAL MEDICINE

## 2022-03-15 LAB — CCP IGG SERPL-ACNC: 1.6 U/ML (ref 0–6.9)

## 2022-03-15 NOTE — PROGRESS NOTES
Normal/low uric acid. This means gout is less likely. Rheumatoid factor (one of the rheumatoid arthritis tests) is negative. This is good.

## 2022-03-17 NOTE — PROGRESS NOTES
Great news, both rheumatoid arthritis tests were negative. Uric acid (\"gout level\") was normal. I'll touch base with you after the x-rays of the hand, elbow, wrist are back.

## 2022-03-31 ENCOUNTER — TELEPHONE (OUTPATIENT)
Dept: INTERNAL MEDICINE CLINIC | Facility: CLINIC | Age: 52
End: 2022-03-31

## 2022-03-31 ENCOUNTER — HOSPITAL ENCOUNTER (OUTPATIENT)
Dept: CT IMAGING | Facility: HOSPITAL | Age: 52
Discharge: HOME | End: 2022-03-31
Attending: INTERNAL MEDICINE
Payer: COMMERCIAL

## 2022-03-31 DIAGNOSIS — R91.1 SOLID NODULE OF LUNG GREATER THAN 8 MM IN DIAMETER: ICD-10-CM

## 2022-03-31 LAB — CREAT BLD-MCNC: 1 MG/DL

## 2022-03-31 PROCEDURE — 82565 ASSAY OF CREATININE: CPT

## 2022-03-31 PROCEDURE — 71260 CT THORAX DX C+: CPT | Performed by: INTERNAL MEDICINE

## 2022-03-31 RX ORDER — IOHEXOL 350 MG/ML
75 INJECTION, SOLUTION INTRAVENOUS
Status: COMPLETED | OUTPATIENT
Start: 2022-03-31 | End: 2022-03-31

## 2022-03-31 RX ADMIN — IOHEXOL 75 ML: 350 INJECTION, SOLUTION INTRAVENOUS at 07:56:00

## 2022-03-31 NOTE — TELEPHONE ENCOUNTER
Radiology [facility] the [facility] called stating the radiologist found abnormal findings and would like Oniel Mcgill to review the results as soon as he can.

## 2022-03-31 NOTE — TELEPHONE ENCOUNTER
Spoke with Will Osei from radiology, she confirmed radiologists name is Elvin Hernandez and ext is [phone]

## 2022-04-14 ENCOUNTER — HOSPITAL ENCOUNTER (OUTPATIENT)
Dept: NUCLEAR MEDICINE | Facility: HOSPITAL | Age: 52
Discharge: HOME | End: 2022-04-14
Attending: INTERNAL MEDICINE
Payer: COMMERCIAL

## 2022-04-14 DIAGNOSIS — R59.0 LAD (LYMPHADENOPATHY), MEDIASTINAL: ICD-10-CM

## 2022-04-14 DIAGNOSIS — R91.8 PULMONARY NODULES: ICD-10-CM

## 2022-04-14 LAB — GLUCOSE BLD-MCNC: 97 MG/DL (ref 70–99)

## 2022-04-14 PROCEDURE — 82962 GLUCOSE BLOOD TEST: CPT

## 2022-04-14 PROCEDURE — 78815 PET IMAGE W/CT SKULL-THIGH: CPT | Performed by: INTERNAL MEDICINE

## 2022-05-06 ENCOUNTER — LAB ENCOUNTER (OUTPATIENT)
Dept: LAB | Age: 52
End: 2022-05-06
Attending: INTERNAL MEDICINE
Payer: COMMERCIAL

## 2022-05-06 DIAGNOSIS — Z20.822 ENCOUNTER FOR PREOPERATIVE SCREENING LABORATORY TESTING FOR COVID-19 VIRUS: ICD-10-CM

## 2022-05-06 DIAGNOSIS — Z01.812 ENCOUNTER FOR PREOPERATIVE SCREENING LABORATORY TESTING FOR COVID-19 VIRUS: ICD-10-CM

## 2022-05-07 LAB — SARS-COV-2 RNA RESP QL NAA+PROBE: NOT DETECTED

## 2022-05-09 ENCOUNTER — ANESTHESIA (OUTPATIENT)
Dept: ENDOSCOPY | Facility: HOSPITAL | Age: 52
End: 2022-05-09
Payer: COMMERCIAL

## 2022-05-09 ENCOUNTER — ANESTHESIA EVENT (OUTPATIENT)
Dept: ENDOSCOPY | Facility: HOSPITAL | Age: 52
End: 2022-05-09
Payer: COMMERCIAL

## 2022-05-09 ENCOUNTER — HOSPITAL ENCOUNTER (OUTPATIENT)
Facility: HOSPITAL | Age: 52
Setting detail: HOSPITAL OUTPATIENT SURGERY
Discharge: HOME | End: 2022-05-09
Attending: INTERNAL MEDICINE | Admitting: INTERNAL MEDICINE
Payer: COMMERCIAL

## 2022-05-09 VITALS
WEIGHT: 270 LBS | RESPIRATION RATE: 22 BRPM | OXYGEN SATURATION: 95 % | TEMPERATURE: 97 F | DIASTOLIC BLOOD PRESSURE: 102 MMHG | HEIGHT: 72 IN | BODY MASS INDEX: 36.57 KG/M2 | SYSTOLIC BLOOD PRESSURE: 139 MMHG | HEART RATE: 70 BPM

## 2022-05-09 DIAGNOSIS — Z01.812 ENCOUNTER FOR PREOPERATIVE SCREENING LABORATORY TESTING FOR COVID-19 VIRUS: Primary | ICD-10-CM

## 2022-05-09 DIAGNOSIS — Z20.822 ENCOUNTER FOR PREOPERATIVE SCREENING LABORATORY TESTING FOR COVID-19 VIRUS: Primary | ICD-10-CM

## 2022-05-09 PROCEDURE — 87176 TISSUE HOMOGENIZATION CULTR: CPT | Performed by: INTERNAL MEDICINE

## 2022-05-09 PROCEDURE — 87102 FUNGUS ISOLATION CULTURE: CPT | Performed by: INTERNAL MEDICINE

## 2022-05-09 PROCEDURE — 87205 SMEAR GRAM STAIN: CPT | Performed by: INTERNAL MEDICINE

## 2022-05-09 PROCEDURE — 87116 MYCOBACTERIA CULTURE: CPT | Performed by: INTERNAL MEDICINE

## 2022-05-09 PROCEDURE — 88173 CYTOPATH EVAL FNA REPORT: CPT | Performed by: INTERNAL MEDICINE

## 2022-05-09 PROCEDURE — 87075 CULTR BACTERIA EXCEPT BLOOD: CPT | Performed by: INTERNAL MEDICINE

## 2022-05-09 PROCEDURE — 87206 SMEAR FLUORESCENT/ACID STAI: CPT | Performed by: INTERNAL MEDICINE

## 2022-05-09 PROCEDURE — 87070 CULTURE OTHR SPECIMN AEROBIC: CPT | Performed by: INTERNAL MEDICINE

## 2022-05-09 PROCEDURE — 88312 SPECIAL STAINS GROUP 1: CPT | Performed by: INTERNAL MEDICINE

## 2022-05-09 PROCEDURE — 88172 CYTP DX EVAL FNA 1ST EA SITE: CPT | Performed by: INTERNAL MEDICINE

## 2022-05-09 PROCEDURE — 07D78ZX EXTRACTION OF THORAX LYMPHATIC, VIA NATURAL OR ARTIFICIAL OPENING ENDOSCOPIC, DIAGNOSTIC: ICD-10-PCS | Performed by: INTERNAL MEDICINE

## 2022-05-09 RX ORDER — ONDANSETRON 2 MG/ML
4 INJECTION INTRAMUSCULAR; INTRAVENOUS ONCE AS NEEDED
Status: DISCONTINUED | OUTPATIENT
Start: 2022-05-09 | End: 2022-05-09

## 2022-05-09 RX ORDER — SODIUM CHLORIDE, SODIUM LACTATE, POTASSIUM CHLORIDE, CALCIUM CHLORIDE 600; 310; 30; 20 MG/100ML; MG/100ML; MG/100ML; MG/100ML
INJECTION, SOLUTION INTRAVENOUS CONTINUOUS
Status: DISCONTINUED | OUTPATIENT
Start: 2022-05-09 | End: 2022-05-09

## 2022-05-09 RX ORDER — LIDOCAINE HYDROCHLORIDE 10 MG/ML
INJECTION, SOLUTION EPIDURAL; INFILTRATION; INTRACAUDAL; PERINEURAL AS NEEDED
Status: DISCONTINUED | OUTPATIENT
Start: 2022-05-09 | End: 2022-05-09 | Stop reason: SURG

## 2022-05-09 RX ORDER — LIDOCAINE HYDROCHLORIDE 20 MG/ML
INJECTION, SOLUTION EPIDURAL; INFILTRATION; INTRACAUDAL; PERINEURAL
Status: DISCONTINUED | OUTPATIENT
Start: 2022-05-09 | End: 2022-05-09

## 2022-05-09 RX ORDER — DEXAMETHASONE SODIUM PHOSPHATE 4 MG/ML
VIAL (ML) INJECTION AS NEEDED
Status: DISCONTINUED | OUTPATIENT
Start: 2022-05-09 | End: 2022-05-09 | Stop reason: SURG

## 2022-05-09 RX ORDER — ONDANSETRON 2 MG/ML
INJECTION INTRAMUSCULAR; INTRAVENOUS AS NEEDED
Status: DISCONTINUED | OUTPATIENT
Start: 2022-05-09 | End: 2022-05-09 | Stop reason: SURG

## 2022-05-09 RX ADMIN — DEXAMETHASONE SODIUM PHOSPHATE 8 MG: 4 MG/ML VIAL (ML) INJECTION at 12:34:00

## 2022-05-09 RX ADMIN — LIDOCAINE HYDROCHLORIDE 50 MG: 10 INJECTION, SOLUTION EPIDURAL; INFILTRATION; INTRACAUDAL; PERINEURAL at 12:29:00

## 2022-05-09 RX ADMIN — ONDANSETRON 4 MG: 2 INJECTION INTRAMUSCULAR; INTRAVENOUS at 12:46:00

## 2022-05-09 NOTE — OPERATIVE REPORT
Bronchoscopy procedure report    Preop diagnosis: abnl PET  Postop diagnosis:  abnl PET  Procedure performed: TBNA of 11L    Sedation used:  General Anesthesia, please see their docmentation  Moderate Sedation Time: NA    Description of procedure: Informed consent was obtained. Oxygen applied via lma. Bronchoscope was inserted via LMA route. Normal vocal cord movement was noted. Trachea appeared long and tortuous. Lilia Rotunda appeared sharp and narrow. Mucous membranes appeared pink and well perfused. There were mild mucoid secretions noted in the proximal airways gamal    Left lung was inspected. .  Right lung was inspected. .    Lesions seen: NA      Endobronchial ultrasound:  EBUS was used to localize the following lymph nodes: 11L. It was used to sample the following lymph nodes via needle biopsy: 11L. A pathologist was immediately present. Samples obtained:    TBNA of 11L for cyto, micro, flow      Post procedure CXR necessary? no  Follow up: In clinic    Patient tolerated the procedure well and was transferred to the recovery area. EBL was <10cc.

## 2022-05-09 NOTE — ANESTHESIA PROCEDURE NOTES
Airway  Date/Time: 5/9/2022 12:30 PM  Urgency: elective      General Information and Staff    Patient location during procedure: OR  Anesthesiologist: Matt Samuel DO  Performed: anesthesiologist     Indications and Patient Condition  Indications for airway management: anesthesia  Spontaneous ventilation: present  Sedation level: deep  Preoxygenated: yes  Patient position: sniffing  Mask difficulty assessment: 1 - vent by mask    Final Airway Details  Final airway type: supraglottic airway      Successful airway: classic  Size 5      Number of attempts at approach: 1

## 2022-05-17 LAB — NON GYNE INTERPRETATION: NEGATIVE

## 2022-07-07 DIAGNOSIS — I10 ESSENTIAL HYPERTENSION: ICD-10-CM

## 2022-07-07 RX ORDER — LISINOPRIL 10 MG/1
10 TABLET ORAL DAILY
Qty: 90 TABLET | Refills: 1 | Status: SHIPPED | OUTPATIENT
Start: 2022-07-07

## 2022-07-07 NOTE — TELEPHONE ENCOUNTER
Protocol Failed     Medication Requested:   lisinopril 10 MG Oral Tab  Filled: 05/10/22 #30 w/ 1 refill   LOV: 01/08/22 w/ Dr. Faith Dodge   RTC: 01/08/23   FOV: not on file   Recent Labs: 01/08/22       Patient due for office visit called he didn't answer LM to CB  Hasn't been seen in 3-6 months

## 2022-08-03 ENCOUNTER — OFFICE VISIT (OUTPATIENT)
Dept: INTERNAL MEDICINE CLINIC | Facility: CLINIC | Age: 52
End: 2022-08-03
Payer: COMMERCIAL

## 2022-08-03 VITALS
SYSTOLIC BLOOD PRESSURE: 128 MMHG | BODY MASS INDEX: 38 KG/M2 | WEIGHT: 282.38 LBS | OXYGEN SATURATION: 99 % | TEMPERATURE: 98 F | RESPIRATION RATE: 15 BRPM | DIASTOLIC BLOOD PRESSURE: 78 MMHG | HEART RATE: 70 BPM

## 2022-08-03 DIAGNOSIS — I10 ESSENTIAL HYPERTENSION: Primary | Chronic | ICD-10-CM

## 2022-08-03 DIAGNOSIS — E66.01 SEVERE OBESITY (BMI 35.0-39.9) WITH COMORBIDITY (HCC): ICD-10-CM

## 2022-08-03 DIAGNOSIS — R41.840 CONCENTRATION DEFICIT: ICD-10-CM

## 2022-08-03 DIAGNOSIS — R91.1 LUNG NODULE: ICD-10-CM

## 2022-08-03 DIAGNOSIS — K21.9 GASTROESOPHAGEAL REFLUX DISEASE, UNSPECIFIED WHETHER ESOPHAGITIS PRESENT: ICD-10-CM

## 2022-08-03 PROCEDURE — 3074F SYST BP LT 130 MM HG: CPT | Performed by: INTERNAL MEDICINE

## 2022-08-03 PROCEDURE — 99214 OFFICE O/P EST MOD 30 MIN: CPT | Performed by: INTERNAL MEDICINE

## 2022-08-03 PROCEDURE — 3078F DIAST BP <80 MM HG: CPT | Performed by: INTERNAL MEDICINE

## 2022-08-03 NOTE — PATIENT INSTRUCTIONS
- Continue with lisinopril 10 mg twice daily. Send a [facility] message when you are running low - will send in a refill at the new dosage.  - For the chest pain/heartburn, take over the counter omeprazole or lansoprazole every morning for 1 month. Stop after that. Can also take Tums/Mylanta as needed for severe symptoms  - Keep an eye on the focus/concentration issues for now. Let us know if that is not better by the end of the month - we may consider repeat labs, screening/testing by our behavioral health department at that point.  - Try and get colonoscopy done by the end of the year  - Follow up in 6 months for chronic issues/blood pressure; follow up earlier as needed. It was a pleasure seeing you in the clinic today. Thank you for choosing the [facility] office for your healthcare needs. Please call at [phone] with any questions or concerns.     Eleazar Andrea MD

## 2022-09-06 DIAGNOSIS — I10 ESSENTIAL HYPERTENSION: ICD-10-CM

## 2022-09-07 RX ORDER — LISINOPRIL 10 MG/1
10 TABLET ORAL DAILY
Qty: 90 TABLET | Refills: 1 | Status: SHIPPED | OUTPATIENT
Start: 2022-09-07

## 2022-10-31 ENCOUNTER — PATIENT MESSAGE (OUTPATIENT)
Dept: INTERNAL MEDICINE CLINIC | Facility: CLINIC | Age: 52
End: 2022-10-31

## 2022-10-31 DIAGNOSIS — I10 ESSENTIAL HYPERTENSION: ICD-10-CM

## 2022-10-31 RX ORDER — LISINOPRIL 10 MG/1
10 TABLET ORAL 2 TIMES DAILY
Qty: 180 TABLET | Refills: 1 | Status: SHIPPED | OUTPATIENT
Start: 2022-10-31

## 2022-10-31 NOTE — TELEPHONE ENCOUNTER
From: Navi Reyes  To: Navin Hamilton MD  Sent: 10/31/2022 2:05 PM CDT  Subject: Need prescription changed at pharmacy     My lisinopril prescription needs updating. Dr Jessica Martinez doubled my dose to one 10mg in the morning and one 10 mg in evening. But the pharmacy never got it. So I run out and my insurance wont pay before a certain date.  As is right now I have enough until 11/5    No response on this message system last time  Nikos Milian

## 2022-11-03 ENCOUNTER — PATIENT MESSAGE (OUTPATIENT)
Dept: INTERNAL MEDICINE CLINIC | Facility: CLINIC | Age: 52
End: 2022-11-03

## 2022-11-03 DIAGNOSIS — I10 PRIMARY HYPERTENSION: Primary | Chronic | ICD-10-CM

## 2022-11-03 RX ORDER — LISINOPRIL 20 MG/1
20 TABLET ORAL DAILY
Qty: 90 TABLET | Refills: 3 | Status: SHIPPED | OUTPATIENT
Start: 2022-11-03

## 2022-11-03 NOTE — TELEPHONE ENCOUNTER
20 mg lisinopril tablet prescription sent to pharmacy - should be able to pick that up - even though prescription says 1 tablet daily again he can just take 1/2 tablet twice daily.

## 2022-11-03 NOTE — TELEPHONE ENCOUNTER
From: Hermelindo Lopez  Sent: 11/3/2022 9:12 AM CDT  To: [facility] Clinical Staff  Subject: Need prescription changed at pharmacy     My prescription is wrong. I got a message from my pharmacy.  Call me immediately   Chandu Plascencia [phone]

## 2022-11-03 NOTE — TELEPHONE ENCOUNTER
Spoke to pharmacy informed wrong dosage has been sent the last few refills, however the correct dosage was sent on 10/31. Per pharmacy insurance would still need to approve change. Spoke to pt as well and Dr. El Diss. Pt stated he is ok with doing a 20MG and breaking pill in half.

## 2022-11-08 NOTE — TELEPHONE ENCOUNTER
If he has enough tablets to last 90 days he can just take the 10 mg twice daily. Otherwise I did send in a new prescription for the 20 mg tablets last week (he can cut 20 mg tabs in half and take 1/2 tab twice daily).

## 2022-11-08 NOTE — TELEPHONE ENCOUNTER
Spoke with patient to make sure he was able to  his Lisinopril medication, patient stated that the Pharmacy gave him 10 mg of Lisinopril and told him to take twice daily. Just want to know if that is ok for him to do? Or would you changes made to the medication.

## 2023-01-24 ENCOUNTER — WORKER'S COMP (OUTPATIENT)
Dept: OCCUPATIONAL MEDICINE | Age: 53
End: 2023-01-24

## 2023-01-24 VITALS
DIASTOLIC BLOOD PRESSURE: 80 MMHG | WEIGHT: 270 LBS | TEMPERATURE: 98 F | HEIGHT: 72 IN | SYSTOLIC BLOOD PRESSURE: 126 MMHG | HEART RATE: 64 BPM | BODY MASS INDEX: 36.57 KG/M2
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
## 2023-01-24 DIAGNOSIS — S81.812A LACERATION OF LEFT LOWER LEG, INITIAL ENCOUNTER: Primary | ICD-10-CM

## 2023-01-24 DIAGNOSIS — Z02.71 ISSUE OF MEDICAL CERTIFICATE FOR DISABILITY EXAMINATION: ICD-10-CM

## 2023-01-24 DIAGNOSIS — S80.812A ABRASION OF LEFT LOWER LEG, INITIAL ENCOUNTER: ICD-10-CM
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
## 2023-01-24 DIAGNOSIS — S83.92XA SPRAIN OF LEFT KNEE, UNSPECIFIED LIGAMENT, INITIAL ENCOUNTER: ICD-10-CM

## 2023-01-24 DIAGNOSIS — M25.562 ACUTE PAIN OF LEFT KNEE: ICD-10-CM

## 2023-01-24 PROCEDURE — A6206 CONTACT LAYER <= 16 SQ IN: HCPCS | Performed by: PHYSICIAN ASSISTANT

## 2023-01-24 PROCEDURE — 99204 OFFICE O/P NEW MOD 45 MIN: CPT | Performed by: PHYSICIAN ASSISTANT

## 2023-01-24 ASSESSMENT — PAIN SCALES - GENERAL: PAINLEVEL: 0

## 2023-02-07 ENCOUNTER — LAB ENCOUNTER (OUTPATIENT)
Dept: LAB | Age: 53
End: 2023-02-07
Attending: INTERNAL MEDICINE
Payer: COMMERCIAL

## 2023-02-07 DIAGNOSIS — Z00.00 ROUTINE GENERAL MEDICAL EXAMINATION AT A HEALTH CARE FACILITY: ICD-10-CM

## 2023-02-07 DIAGNOSIS — R53.83 FATIGUE, UNSPECIFIED TYPE: ICD-10-CM

## 2023-02-07 DIAGNOSIS — I10 PRIMARY HYPERTENSION: ICD-10-CM

## 2023-02-07 DIAGNOSIS — Z12.5 SCREENING FOR MALIGNANT NEOPLASM OF PROSTATE: ICD-10-CM

## 2023-02-07 LAB
ALBUMIN SERPL-MCNC: 3.8 G/DL (ref 3.4–5)
ALBUMIN/GLOB SERPL: 1.2 {RATIO} (ref 1–2)
ALP LIVER SERPL-CCNC: 54 U/L
ALT SERPL-CCNC: 33 U/L
ANION GAP SERPL CALC-SCNC: 7 MMOL/L (ref 0–18)
AST SERPL-CCNC: 24 U/L (ref 15–37)
BASOPHILS # BLD AUTO: 0.03 X10(3) UL (ref 0–0.2)
BASOPHILS NFR BLD AUTO: 0.6 %
BILIRUB SERPL-MCNC: 0.5 MG/DL (ref 0.1–2)
BUN BLD-MCNC: 13 MG/DL (ref 7–18)
CALCIUM BLD-MCNC: 9.3 MG/DL (ref 8.5–10.1)
CHLORIDE SERPL-SCNC: 109 MMOL/L (ref 98–112)
CHOLEST SERPL-MCNC: 172 MG/DL (ref ?–200)
CO2 SERPL-SCNC: 26 MMOL/L (ref 21–32)
COMPLEXED PSA SERPL-MCNC: 0.94 NG/ML (ref ?–4)
CREAT BLD-MCNC: 1 MG/DL
EOSINOPHIL # BLD AUTO: 0.16 X10(3) UL (ref 0–0.7)
EOSINOPHIL NFR BLD AUTO: 3 %
ERYTHROCYTE [DISTWIDTH] IN BLOOD BY AUTOMATED COUNT: 14.3 %
EST. AVERAGE GLUCOSE BLD GHB EST-MCNC: 117 MG/DL (ref 68–126)
FASTING PATIENT LIPID ANSWER: YES
FASTING STATUS PATIENT QL REPORTED: YES
GFR SERPLBLD BASED ON 1.73 SQ M-ARVRAT: 91 ML/MIN/1.73M2 (ref 60–?)
GLOBULIN PLAS-MCNC: 3.2 G/DL (ref 2.8–4.4)
GLUCOSE BLD-MCNC: 91 MG/DL (ref 70–99)
HBA1C MFR BLD: 5.7 % (ref ?–5.7)
HCT VFR BLD AUTO: 42.8 %
HDLC SERPL-MCNC: 53 MG/DL (ref 40–59)
HGB BLD-MCNC: 14 G/DL
IMM GRANULOCYTES # BLD AUTO: 0.01 X10(3) UL (ref 0–1)
IMM GRANULOCYTES NFR BLD: 0.2 %
LDLC SERPL CALC-MCNC: 101 MG/DL (ref ?–100)
LYMPHOCYTES # BLD AUTO: 2.32 X10(3) UL (ref 1–4)
LYMPHOCYTES NFR BLD AUTO: 43 %
MCH RBC QN AUTO: 30.2 PG (ref 26–34)
MCHC RBC AUTO-ENTMCNC: 32.7 G/DL (ref 31–37)
MCV RBC AUTO: 92.4 FL
MONOCYTES # BLD AUTO: 0.45 X10(3) UL (ref 0.1–1)
MONOCYTES NFR BLD AUTO: 8.3 %
NEUTROPHILS # BLD AUTO: 2.42 X10 (3) UL (ref 1.5–7.7)
NEUTROPHILS # BLD AUTO: 2.42 X10(3) UL (ref 1.5–7.7)
NEUTROPHILS NFR BLD AUTO: 44.9 %
NONHDLC SERPL-MCNC: 119 MG/DL (ref ?–130)
OSMOLALITY SERPL CALC.SUM OF ELEC: 294 MOSM/KG (ref 275–295)
PLATELET # BLD AUTO: 202 10(3)UL (ref 150–450)
POTASSIUM SERPL-SCNC: 3.9 MMOL/L (ref 3.5–5.1)
PROT SERPL-MCNC: 7 G/DL (ref 6.4–8.2)
RBC # BLD AUTO: 4.63 X10(6)UL
SODIUM SERPL-SCNC: 142 MMOL/L (ref 136–145)
TRIGL SERPL-MCNC: 97 MG/DL (ref 30–149)
TSI SER-ACNC: 0.8 MIU/ML (ref 0.36–3.74)
VLDLC SERPL CALC-MCNC: 16 MG/DL (ref 0–30)
WBC # BLD AUTO: 5.4 X10(3) UL (ref 4–11)

## 2023-02-07 PROCEDURE — 84403 ASSAY OF TOTAL TESTOSTERONE: CPT

## 2023-02-07 PROCEDURE — 36415 COLL VENOUS BLD VENIPUNCTURE: CPT

## 2023-02-07 PROCEDURE — 85025 COMPLETE CBC W/AUTO DIFF WBC: CPT

## 2023-02-07 PROCEDURE — 84443 ASSAY THYROID STIM HORMONE: CPT

## 2023-02-07 PROCEDURE — 84402 ASSAY OF FREE TESTOSTERONE: CPT

## 2023-02-07 PROCEDURE — 80061 LIPID PANEL: CPT

## 2023-02-07 PROCEDURE — 80053 COMPREHEN METABOLIC PANEL: CPT

## 2023-02-07 PROCEDURE — 83036 HEMOGLOBIN GLYCOSYLATED A1C: CPT

## 2023-02-09 ENCOUNTER — WORKER'S COMP (OUTPATIENT)
Dept: OCCUPATIONAL MEDICINE | Age: 53
End: 2023-02-09

## 2023-02-09 VITALS — SYSTOLIC BLOOD PRESSURE: 131 MMHG | DIASTOLIC BLOOD PRESSURE: 88 MMHG | TEMPERATURE: 97.4 F | HEART RATE: 69 BPM
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
## 2023-02-09 DIAGNOSIS — Z02.71 ISSUE OF MEDICAL CERTIFICATE FOR DISABILITY EXAMINATION: ICD-10-CM

## 2023-02-09 DIAGNOSIS — M25.562 ACUTE PAIN OF LEFT KNEE: ICD-10-CM

## 2023-02-09 DIAGNOSIS — S83.92XD SPRAIN OF LEFT KNEE, UNSPECIFIED LIGAMENT, SUBSEQUENT ENCOUNTER: Primary | ICD-10-CM

## 2023-02-09 DIAGNOSIS — S81.812D LACERATION OF LEFT LOWER LEG, SUBSEQUENT ENCOUNTER: ICD-10-CM
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
## 2023-02-09 DIAGNOSIS — S80.812D ABRASION OF LEFT LOWER LEG, SUBSEQUENT ENCOUNTER: ICD-10-CM

## 2023-02-09 PROCEDURE — 99214 OFFICE O/P EST MOD 30 MIN: CPT | Performed by: PHYSICIAN ASSISTANT

## 2023-02-09 ASSESSMENT — PAIN SCALES - GENERAL: PAINLEVEL: 6

## 2023-02-12 LAB
TESTOSTERONE TOTAL: 485.1 NG/DL
TESTOSTERONE, FREE -MS/MS: 66 PG/ML

## 2023-02-14 ENCOUNTER — OFFICE VISIT (OUTPATIENT)
Dept: INTERNAL MEDICINE CLINIC | Facility: CLINIC | Age: 53
End: 2023-02-14
Payer: COMMERCIAL

## 2023-02-14 VITALS
DIASTOLIC BLOOD PRESSURE: 74 MMHG | WEIGHT: 278.63 LBS | SYSTOLIC BLOOD PRESSURE: 120 MMHG | TEMPERATURE: 99 F | BODY MASS INDEX: 37.74 KG/M2 | OXYGEN SATURATION: 99 % | HEIGHT: 72 IN | HEART RATE: 79 BPM

## 2023-02-14 DIAGNOSIS — Z12.11 SCREENING FOR MALIGNANT NEOPLASM OF COLON: ICD-10-CM

## 2023-02-14 DIAGNOSIS — Z00.00 ENCOUNTER FOR PREVENTATIVE ADULT HEALTH CARE EXAMINATION: Primary | ICD-10-CM

## 2023-02-14 DIAGNOSIS — Z82.49 FAMILY HISTORY OF CORONARY ARTERY DISEASE IN FATHER: ICD-10-CM

## 2023-02-14 DIAGNOSIS — I10 PRIMARY HYPERTENSION: ICD-10-CM

## 2023-02-14 PROCEDURE — 3078F DIAST BP <80 MM HG: CPT | Performed by: INTERNAL MEDICINE

## 2023-02-14 PROCEDURE — 3008F BODY MASS INDEX DOCD: CPT | Performed by: INTERNAL MEDICINE

## 2023-02-14 PROCEDURE — 99396 PREV VISIT EST AGE 40-64: CPT | Performed by: INTERNAL MEDICINE

## 2023-02-14 PROCEDURE — 3074F SYST BP LT 130 MM HG: CPT | Performed by: INTERNAL MEDICINE

## 2023-02-14 NOTE — PATIENT INSTRUCTIONS
- Blood pressure looks great. Will continue lisinopril 10 mg twice daily  - A1c test was slightly high (3 month blood sugar average), just in prediabetes range. Focus on diet for this - watch carbs and sugars.  - LDL/bad cholesterol was better. Other blood tests were normal.  - Do stool blood test (FIT test) at home and drop off at any [facility] lab. - Follow up with [facility]:  Dr. Anastasia Armando, others  [address] [facility]. [place], [facility]  [phone]    - Follow up with Dr. Aide Cox for left knee:  [address]  [phone]    - Follow up with Dr. Vita Pozo for your family history of heart disease:  Mitzi [address]  [address], [address]  Phone: [phone]    - Follow up with me in 1 year for physical/chronic issues; follow up earlier as needed. It was a pleasure seeing you in the clinic today. Thank you for choosing the [facility] office for your healthcare needs. Please call at [phone] with any questions or concerns.     Daniel Loza MD

## 2023-02-15 RX ORDER — TADALAFIL 10 MG/1
10 TABLET ORAL
COMMUNITY

## 2023-02-23 ENCOUNTER — WORKER'S COMP (OUTPATIENT)
Dept: OCCUPATIONAL MEDICINE | Age: 53
End: 2023-02-23

## 2023-02-23 VITALS
TEMPERATURE: 97.5 F | DIASTOLIC BLOOD PRESSURE: 87 MMHG | SYSTOLIC BLOOD PRESSURE: 135 MMHG | WEIGHT: 270 LBS | BODY MASS INDEX: 36.57 KG/M2 | HEIGHT: 72 IN | HEART RATE: 65 BPM

## 2023-02-23 DIAGNOSIS — S81.812D LACERATION OF LEFT LOWER LEG, SUBSEQUENT ENCOUNTER: ICD-10-CM

## 2023-02-23 DIAGNOSIS — S83.92XD SPRAIN OF LEFT KNEE, UNSPECIFIED LIGAMENT, SUBSEQUENT ENCOUNTER: Primary | ICD-10-CM

## 2023-02-23 DIAGNOSIS — S80.812D ABRASION OF LEFT LOWER LEG, SUBSEQUENT ENCOUNTER: ICD-10-CM
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
## 2023-02-23 DIAGNOSIS — Z02.71 ISSUE OF MEDICAL CERTIFICATE FOR DISABILITY EXAMINATION: ICD-10-CM

## 2023-02-23 DIAGNOSIS — M25.562 ACUTE PAIN OF LEFT KNEE: ICD-10-CM

## 2023-02-23 PROCEDURE — 99213 OFFICE O/P EST LOW 20 MIN: CPT | Performed by: PHYSICIAN ASSISTANT

## 2023-02-23 ASSESSMENT — PAIN SCALES - GENERAL: PAINLEVEL: 3

## 2023-03-15 ENCOUNTER — LAB ENCOUNTER (OUTPATIENT)
Dept: LAB | Facility: HOSPITAL | Age: 53
End: 2023-03-15
Attending: INTERNAL MEDICINE
Payer: COMMERCIAL

## 2023-03-15 DIAGNOSIS — Z82.49 FAMILY HISTORY OF PREMATURE CAD: ICD-10-CM

## 2023-03-15 DIAGNOSIS — R07.9 CHEST PAIN: Primary | ICD-10-CM

## 2023-03-15 DIAGNOSIS — E78.00 PURE HYPERCHOLESTEROLEMIA: ICD-10-CM

## 2023-03-15 DIAGNOSIS — Z12.11 SCREENING FOR MALIGNANT NEOPLASM OF COLON: ICD-10-CM

## 2023-03-15 LAB — HEMOCCULT STL QL: NEGATIVE

## 2023-03-15 PROCEDURE — 83695 ASSAY OF LIPOPROTEIN(A): CPT

## 2023-03-15 PROCEDURE — 82274 ASSAY TEST FOR BLOOD FECAL: CPT

## 2023-03-15 PROCEDURE — 36415 COLL VENOUS BLD VENIPUNCTURE: CPT

## 2023-03-16 LAB — LIPOPROTEIN (A): 7 MG/DL

## 2023-03-23 ENCOUNTER — TELEPHONE (OUTPATIENT)
Dept: INTERNAL MEDICINE CLINIC | Facility: CLINIC | Age: 53
End: 2023-03-23

## 2023-03-27 ENCOUNTER — HOSPITAL ENCOUNTER (OUTPATIENT)
Dept: CV DIAGNOSTICS | Facility: HOSPITAL | Age: 53
Discharge: HOME | End: 2023-03-27
Attending: INTERNAL MEDICINE
Payer: COMMERCIAL

## 2023-03-27 DIAGNOSIS — R07.9 CHEST PAIN, UNSPECIFIED: ICD-10-CM

## 2023-03-27 DIAGNOSIS — I10 ESSENTIAL HYPERTENSION: ICD-10-CM

## 2023-03-27 DIAGNOSIS — Z82.49 FAMILY HISTORY OF PREMATURE CAD: ICD-10-CM

## 2023-03-27 PROCEDURE — 93350 STRESS TTE ONLY: CPT | Performed by: INTERNAL MEDICINE

## 2023-03-27 PROCEDURE — 93017 CV STRESS TEST TRACING ONLY: CPT | Performed by: INTERNAL MEDICINE

## 2023-03-27 PROCEDURE — 93018 CV STRESS TEST I&R ONLY: CPT | Performed by: INTERNAL MEDICINE

## 2024-02-10 ENCOUNTER — OFFICE VISIT (OUTPATIENT)
Dept: FAMILY MEDICINE CLINIC | Facility: CLINIC | Age: 54
End: 2024-02-10
Payer: COMMERCIAL

## 2024-02-10 VITALS
HEART RATE: 76 BPM | TEMPERATURE: 98 F | DIASTOLIC BLOOD PRESSURE: 80 MMHG | SYSTOLIC BLOOD PRESSURE: 128 MMHG | BODY MASS INDEX: 37.79 KG/M2 | HEIGHT: 72 IN | WEIGHT: 279 LBS | OXYGEN SATURATION: 99 % | RESPIRATION RATE: 16 BRPM

## 2024-02-10 DIAGNOSIS — J01.00 ACUTE NON-RECURRENT MAXILLARY SINUSITIS: Primary | ICD-10-CM

## 2024-02-10 PROCEDURE — 3074F SYST BP LT 130 MM HG: CPT | Performed by: NURSE PRACTITIONER

## 2024-02-10 PROCEDURE — 3008F BODY MASS INDEX DOCD: CPT | Performed by: NURSE PRACTITIONER

## 2024-02-10 PROCEDURE — 3079F DIAST BP 80-89 MM HG: CPT | Performed by: NURSE PRACTITIONER

## 2024-02-10 PROCEDURE — 99213 OFFICE O/P EST LOW 20 MIN: CPT | Performed by: NURSE PRACTITIONER

## 2024-02-10 RX ORDER — FLUTICASONE PROPIONATE 50 MCG
2 SPRAY, SUSPENSION (ML) NASAL DAILY
Qty: 1 EACH | Refills: 0 | Status: SHIPPED | OUTPATIENT
Start: 2024-02-10 | End: 2024-02-24

## 2024-02-10 RX ORDER — BENZONATATE 200 MG/1
200 CAPSULE ORAL 3 TIMES DAILY PRN
Qty: 20 CAPSULE | Refills: 0 | Status: SHIPPED | OUTPATIENT
Start: 2024-02-10

## 2024-02-10 RX ORDER — AMOXICILLIN AND CLAVULANATE POTASSIUM 875; 125 MG/1; MG/1
1 TABLET, FILM COATED ORAL 2 TIMES DAILY
Qty: 20 TABLET | Refills: 0 | Status: SHIPPED | OUTPATIENT
Start: 2024-02-10 | End: 2024-02-20

## 2024-02-10 NOTE — PROGRESS NOTES
CHIEF COMPLAINT:     Chief Complaint   Patient presents with    Cough     Cold, cough, sinus congestion - Entered by patient x 3 weeks fever wheezing        HPI:   Chavez Gallardo is a 53 year old male who presents for upper respiratory symptoms for  3 weeks. Patient reports  cough .  Associated symptoms include cough, sinus pressure, sinus congestion.  Denies fever, chills, GI symptoms.   Symptoms have been increasing since onset.  Treating symptoms with Nyquil, Delsym.     No hx of COVID; No COVID exposure  Home test negative    Current Outpatient Medications   Medication Sig Dispense Refill    Tadalafil 10 MG Oral Tab Take 1 tablet (10 mg total) by mouth daily as needed.      lisinopril 20 MG Oral Tab Take 1 tablet (20 mg total) by mouth in the morning. 90 tablet 3      Past Medical History:   Diagnosis Date    Lipid screening 7/28/2011    OBESITY       Past Surgical History:   Procedure Laterality Date    ARTHROSCOPY OF JOINT UNLISTED Right          Social History     Socioeconomic History    Marital status:     Number of children: 2   Occupational History    Occupation:    Tobacco Use    Smoking status: Former     Years: 2     Types: Cigarettes    Smokeless tobacco: Never   Vaping Use    Vaping Use: Never used   Substance and Sexual Activity    Alcohol use: Yes     Comment: occasional    Drug use: No   Other Topics Concern    Caffeine Concern Yes     Comment: 5 cups of coffee/soda daily.     Exercise No   Social History Narrative    ** Merged History Encounter **              REVIEW OF SYSTEMS:   GENERAL: feels well otherwise,   Good appetite  SKIN: no rashes or abnormal skin lesions  HEENT: See HPI  LUNGS: denies shortness of breath or wheezing, See HPI  CARDIOVASCULAR: denies chest pain or palpitations   GI: denies N/V/C or abdominal pain  NEURO: Denies headaches    EXAM:   /80   Pulse 76   Temp 97.6 °F (36.4 °C) (Oral)   Resp 16   Ht 6' (1.829 m)   Wt 279 lb (126.6 kg)   SpO2  99%   BMI 37.84 kg/m²   GENERAL: well developed, well nourished, in no apparent distress  SKIN: no rashes,no suspicious lesions  HEAD: atraumatic, normocephalic.    EYES: conjunctiva clear  EARS: TM's clear, no bulging, no retraction, + fluid, bony landmarks visualized  NOSE: Nostrils patent, thick purulent nasal discharge, nasal mucosa redden and swollen   THROAT: Oral mucosa pink, moist. Posterior pharynx is not erythematous. + PND.  Tonsils 1/4.    NECK: Supple, non-tender  LUNGS: clear to auscultation bilaterally; good air movement.  Breathing is non labored.  CARDIO: RRR without murmur  EXTREMITIES: no cyanosis, clubbing or edema  LYMPH:  No cervical lymphadenopathy.        ASSESSMENT AND PLAN:   Chavez Gallardo is a 53 year old male who presents with     ASSESSMENT:   Encounter Diagnosis   Name Primary?    Acute non-recurrent maxillary sinusitis Yes       PLAN:     Meds as below.  Risks, benefits, and side effects of medication explained and discussed.  Discussed additional symptom relief measures with patient.   To f/u with PCP if sx do not resolve as anticipated.      Meds & Refills for this Visit:  Requested Prescriptions     Signed Prescriptions Disp Refills    benzonatate 200 MG Oral Cap 20 capsule 0     Sig: Take 1 capsule (200 mg total) by mouth 3 (three) times daily as needed for cough. Take with a full glass of water and DO NOT CHEW.    amoxicillin clavulanate 875-125 MG Oral Tab 20 tablet 0     Sig: Take 1 tablet by mouth 2 (two) times daily for 10 days.    fluticasone propionate 50 MCG/ACT Nasal Suspension 1 each 0     Si sprays by Each Nare route daily for 14 days.           Patient Instructions   -Augmentin as prescribed, take with food.   -Push fluids and plenty of rest   -Benzonatate as prescribed for dry cough    -OTC cough medicine such as Mucinex DM or Robitussin DM (guaifenesin and dextromethorphan) as packet insert for congested cough.    -OTC Tylenol/Ibuprofen as packet insert If no  allergies  -Soothing cough drops as packet insert   -Flonase as prescribed   -Good handwashing, to prevent spread of virus      Follow up in 3-5 days for worsening symptoms with clinic or PCP       The patient indicates understanding of these issues and agrees to the plan.

## 2024-02-10 NOTE — PATIENT INSTRUCTIONS
-Augmentin as prescribed, take with food.   -Push fluids and plenty of rest   -Benzonatate as prescribed for dry cough    -OTC cough medicine such as Mucinex DM or Robitussin DM (guaifenesin and dextromethorphan) as packet insert for congested cough.    -OTC Tylenol/Ibuprofen as packet insert If no allergies  -Soothing cough drops as packet insert   -Flonase as prescribed   -Good handwashing, to prevent spread of virus      Follow up in 3-5 days for worsening symptoms with clinic or PCP

## 2024-02-19 ENCOUNTER — OFFICE VISIT (OUTPATIENT)
Dept: INTERNAL MEDICINE CLINIC | Facility: CLINIC | Age: 54
End: 2024-02-19
Payer: COMMERCIAL

## 2024-02-19 ENCOUNTER — LAB ENCOUNTER (OUTPATIENT)
Dept: LAB | Age: 54
End: 2024-02-19
Attending: INTERNAL MEDICINE
Payer: COMMERCIAL

## 2024-02-19 VITALS
SYSTOLIC BLOOD PRESSURE: 110 MMHG | RESPIRATION RATE: 21 BRPM | BODY MASS INDEX: 37.65 KG/M2 | HEIGHT: 72 IN | OXYGEN SATURATION: 98 % | HEART RATE: 71 BPM | WEIGHT: 278 LBS | TEMPERATURE: 98 F | DIASTOLIC BLOOD PRESSURE: 78 MMHG

## 2024-02-19 DIAGNOSIS — Z12.5 SCREENING FOR MALIGNANT NEOPLASM OF PROSTATE: ICD-10-CM

## 2024-02-19 DIAGNOSIS — Z00.00 ENCOUNTER FOR PREVENTATIVE ADULT HEALTH CARE EXAMINATION: Primary | ICD-10-CM

## 2024-02-19 DIAGNOSIS — I10 PRIMARY HYPERTENSION: Chronic | ICD-10-CM

## 2024-02-19 DIAGNOSIS — Z12.11 SCREENING FOR MALIGNANT NEOPLASM OF COLON: ICD-10-CM

## 2024-02-19 DIAGNOSIS — Z23 NEED FOR SHINGLES VACCINE: ICD-10-CM

## 2024-02-19 DIAGNOSIS — E66.01 SEVERE OBESITY (BMI 35.0-39.9) WITH COMORBIDITY (HCC): ICD-10-CM

## 2024-02-19 DIAGNOSIS — Z00.00 ENCOUNTER FOR PREVENTATIVE ADULT HEALTH CARE EXAMINATION: ICD-10-CM

## 2024-02-19 LAB
ALBUMIN SERPL-MCNC: 3.8 G/DL (ref 3.4–5)
ALBUMIN/GLOB SERPL: 1 {RATIO} (ref 1–2)
ALP LIVER SERPL-CCNC: 83 U/L
ALT SERPL-CCNC: 33 U/L
ANION GAP SERPL CALC-SCNC: 2 MMOL/L (ref 0–18)
AST SERPL-CCNC: 31 U/L (ref 15–37)
BASOPHILS # BLD AUTO: 0.04 X10(3) UL (ref 0–0.2)
BASOPHILS NFR BLD AUTO: 0.7 %
BILIRUB SERPL-MCNC: 0.4 MG/DL (ref 0.1–2)
BUN BLD-MCNC: 12 MG/DL (ref 9–23)
CALCIUM BLD-MCNC: 9.4 MG/DL (ref 8.5–10.1)
CHLORIDE SERPL-SCNC: 107 MMOL/L (ref 98–112)
CHOLEST SERPL-MCNC: 185 MG/DL (ref ?–200)
CO2 SERPL-SCNC: 28 MMOL/L (ref 21–32)
COMPLEXED PSA SERPL-MCNC: 1.3 NG/ML (ref ?–4)
CREAT BLD-MCNC: 0.96 MG/DL
EGFRCR SERPLBLD CKD-EPI 2021: 95 ML/MIN/1.73M2 (ref 60–?)
EOSINOPHIL # BLD AUTO: 0.13 X10(3) UL (ref 0–0.7)
EOSINOPHIL NFR BLD AUTO: 2.2 %
ERYTHROCYTE [DISTWIDTH] IN BLOOD BY AUTOMATED COUNT: 14.2 %
EST. AVERAGE GLUCOSE BLD GHB EST-MCNC: 126 MG/DL (ref 68–126)
FASTING PATIENT LIPID ANSWER: YES
FASTING STATUS PATIENT QL REPORTED: YES
GLOBULIN PLAS-MCNC: 3.8 G/DL (ref 2.8–4.4)
GLUCOSE BLD-MCNC: 97 MG/DL (ref 70–99)
HBA1C MFR BLD: 6 % (ref ?–5.7)
HCT VFR BLD AUTO: 43.5 %
HDLC SERPL-MCNC: 56 MG/DL (ref 40–59)
HGB BLD-MCNC: 14.5 G/DL
IMM GRANULOCYTES # BLD AUTO: 0.02 X10(3) UL (ref 0–1)
IMM GRANULOCYTES NFR BLD: 0.3 %
LDLC SERPL CALC-MCNC: 117 MG/DL (ref ?–100)
LYMPHOCYTES # BLD AUTO: 2.45 X10(3) UL (ref 1–4)
LYMPHOCYTES NFR BLD AUTO: 41 %
MCH RBC QN AUTO: 29.4 PG (ref 26–34)
MCHC RBC AUTO-ENTMCNC: 33.3 G/DL (ref 31–37)
MCV RBC AUTO: 88.1 FL
MONOCYTES # BLD AUTO: 0.63 X10(3) UL (ref 0.1–1)
MONOCYTES NFR BLD AUTO: 10.5 %
NEUTROPHILS # BLD AUTO: 2.71 X10 (3) UL (ref 1.5–7.7)
NEUTROPHILS # BLD AUTO: 2.71 X10(3) UL (ref 1.5–7.7)
NEUTROPHILS NFR BLD AUTO: 45.3 %
NONHDLC SERPL-MCNC: 129 MG/DL (ref ?–130)
OSMOLALITY SERPL CALC.SUM OF ELEC: 284 MOSM/KG (ref 275–295)
PLATELET # BLD AUTO: 279 10(3)UL (ref 150–450)
POTASSIUM SERPL-SCNC: 4.1 MMOL/L (ref 3.5–5.1)
PROT SERPL-MCNC: 7.6 G/DL (ref 6.4–8.2)
RBC # BLD AUTO: 4.94 X10(6)UL
SODIUM SERPL-SCNC: 137 MMOL/L (ref 136–145)
TRIGL SERPL-MCNC: 64 MG/DL (ref 30–149)
VLDLC SERPL CALC-MCNC: 11 MG/DL (ref 0–30)
WBC # BLD AUTO: 6 X10(3) UL (ref 4–11)

## 2024-02-19 PROCEDURE — 3074F SYST BP LT 130 MM HG: CPT | Performed by: INTERNAL MEDICINE

## 2024-02-19 PROCEDURE — 85025 COMPLETE CBC W/AUTO DIFF WBC: CPT | Performed by: INTERNAL MEDICINE

## 2024-02-19 PROCEDURE — 80061 LIPID PANEL: CPT | Performed by: INTERNAL MEDICINE

## 2024-02-19 PROCEDURE — 80053 COMPREHEN METABOLIC PANEL: CPT | Performed by: INTERNAL MEDICINE

## 2024-02-19 PROCEDURE — 83036 HEMOGLOBIN GLYCOSYLATED A1C: CPT | Performed by: INTERNAL MEDICINE

## 2024-02-19 PROCEDURE — 99396 PREV VISIT EST AGE 40-64: CPT | Performed by: INTERNAL MEDICINE

## 2024-02-19 PROCEDURE — 3008F BODY MASS INDEX DOCD: CPT | Performed by: INTERNAL MEDICINE

## 2024-02-19 PROCEDURE — 84153 ASSAY OF PSA TOTAL: CPT | Performed by: INTERNAL MEDICINE

## 2024-02-19 PROCEDURE — 3078F DIAST BP <80 MM HG: CPT | Performed by: INTERNAL MEDICINE

## 2024-02-19 RX ORDER — LISINOPRIL 20 MG/1
20 TABLET ORAL DAILY
Qty: 90 TABLET | Refills: 3 | Status: SHIPPED | OUTPATIENT
Start: 2024-02-19

## 2024-02-19 NOTE — PROGRESS NOTES
Chavez Gallardo is a 53 year old male.   HPI:   Patient presents for CPX/wellness examination.  Diet: Trying to eat healthier  Exercise: Working out more  Vision: Up to date  Dental: Up to date    Acute issues:  Getting over a URI - just finishing a course of Augmentin. Feels much better.    Chronic issues:  Hypertension - borderline reading today.    Still with occasional flare-ups of joints pains though it is better than last year.    Past medical, family, surgical and social history were reviewed as listed in the chart, and are unchanged from previous visit.  REVIEW OF SYSTEMS:   GENERAL/ const: no fevers/chills, no unintentional weight loss  SKIN: denies any unusual skin lesions  EYES:no vision problems  HEENT: URI symptoms, improved  LUNGS: denies shortness of breath   CARDIOVASCULAR: denies chest pain  GI: denies nausea/emesis/ abdominal pain diarrhea constipation  : erectile dysfunction; denies dysuria   MUSCULOSKELETAL: chronic joint pains  NEURO: denies headaches  PSYCHIATRIC: denies issues  ENDOCRINE: no hot/cold intolerance  ALLERGY: No Known Allergies  PAST HISTORY:     Current Outpatient Medications:     benzonatate 200 MG Oral Cap, Take 1 capsule (200 mg total) by mouth 3 (three) times daily as needed for cough. Take with a full glass of water and DO NOT CHEW., Disp: 20 capsule, Rfl: 0    amoxicillin clavulanate 875-125 MG Oral Tab, Take 1 tablet by mouth 2 (two) times daily for 10 days., Disp: 20 tablet, Rfl: 0    fluticasone propionate 50 MCG/ACT Nasal Suspension, 2 sprays by Each Nare route daily for 14 days., Disp: 1 each, Rfl: 0    Tadalafil 10 MG Oral Tab, Take 1 tablet (10 mg total) by mouth daily as needed., Disp: , Rfl:     lisinopril 20 MG Oral Tab, Take 1 tablet (20 mg total) by mouth in the morning., Disp: 90 tablet, Rfl: 3  Medical:  has a past medical history of Anxiety (Two years), Arthritis (11 01 2016), Esophageal reflux (Two years), Lipid screening (07/28/2011), OBESITY, and Obesity  (50 yrs).  Surgical:  has a past surgical history that includes arthroscopy of joint unlisted (Right).  Family: family history includes CHF in his mother; Cancer in his mother; Depression in his father; Heart Disorder in his father; Hypertension in his father; Obesity in his mother; Psychiatric in his father; Pulmonary Disease in his father; chronic liver disease in his maternal grandfather; hodgkin's disease in his mother; lung cancer in his maternal grandmother; valvular heart disease in his father.  Social:  reports that he quit smoking about 25 years ago. His smoking use included cigarettes. He has a 3 pack-year smoking history. He has never used smokeless tobacco. He reports current alcohol use of about 30.0 standard drinks of alcohol per week. He reports that he does not use drugs.  Wt Readings from Last 6 Encounters:   02/19/24 278 lb (126.1 kg)   02/10/24 279 lb (126.6 kg)   02/14/23 278 lb 9.6 oz (126.4 kg)   08/03/22 282 lb 6.4 oz (128.1 kg)   05/04/22 270 lb (122.5 kg)   03/31/22 260 lb (117.9 kg)     EXAM:   /88   Pulse 71   Temp 97.6 °F (36.4 °C) (Temporal)   Resp 21   Ht 6' (1.829 m)   Wt 278 lb (126.1 kg)   SpO2 98%   BMI 37.70 kg/m²   GENERAL: Alert and oriented, well developed, well nourished,in no apparent distress  SKIN: no rashes,no suspicious lesions  HEENT: atraumatic, PERRLA, EOMI, normal lid and conjunctiva, normal external canals and tympanic membranes bilaterally  NECK: supple, no jvd, no thyromegaly, no palpable/tender cervical lymphadenopathy  LUNGS: clear to auscultation bilaterally, no wheezing/rubs  CARDIO: RRR without murmurs.  No clubbing, cyanosis or edema.  GI: soft non tender nondistended no hepatosplenomegaly, bowel sounds throughout  NEURO: CN II-XII intact, 5/5 strength all extremities  MS: Full ROM  PSYCH: pleasant, appropriate mood and affect  ASSESSMENT AND PLAN:   1.  Encounter for preventative adult health examination  2. Severe obesity (BMI 35.0-39.9) with  comorbidity (HCC)  3. Screening for malignant neoplasm of colon  4. Need for shingles vaccine  5. Screening for malignant neoplasm of prostate  Age appropriate health guidance and counseling provided.  Screening labs ordered as below.  Recommend Shingrix - patient will think about it.  FIT testing ordered, due next month.  Body mass index is 37.7 kg/m².  Patient has been exercising more.  Interested in GLP-1RA therapy.  Discussed risks and benefits of therapy with these medications.  Patient would like to try them.  Advised him to check with insurance about coverage and local pharmacy about availability for Saxenda, Zepbound, or Wegovy.  - CBC With Differential With Platelet; Future   - Comp Metabolic Panel (14); Future  - Hemoglobin A1C; Future  - Lipid Panel; Future  - Occult Blood, Fecal, FIT Immunoassay; Future  - PSA Total, Screen; Future    6. Primary hypertension  Repeat reading excellent.  Will continue lisinopril 20 mg every day.  Metabolic panel ordered.  - lisinopril 20 MG Oral Tab; Take 1 tablet (20 mg total) by mouth daily.  Dispense: 90 tablet; Refill: 3  - Comp Metabolic Panel (14); Future    There are no diagnoses linked to this encounter.      Patient Care Team:  Arlin Doan MD as PCP - General (Internal Medicine)  Hesham Huang MD as Consulting Physician (Surgery, Orthopaedic)  Fern Nix MD as Consulting Physician (Cardiovascular Diseases)  The patient indicates understanding of these issues and agrees to the plan.  The patient is asked to return to clinic in 12 months for follow up on chronic issues/CPX, or earlier if acute issues arise.    Arlin Doan MD

## 2024-02-19 NOTE — PATIENT INSTRUCTIONS
- Get blood tests done today  - Do at home stool test next month (March 15th or later) and drop off at the lab  - Consider shingles (Shingrix) shots  - Will continue lisinopril at current dose for blood pressure  - As far as weight loss, check with your insurance to see if these weight loss medications are covered:  Wegovy, Zepbound, or Saxenda  Then check with your local pharmacy to see if they have these in stock.    It was a pleasure seeing you in the clinic today.  Thank you for choosing the [facility] office for your healthcare needs. Please call at [phone] with any questions or concerns.    Arlin Doan MD

## 2024-06-26 DIAGNOSIS — K21.9 GASTROESOPHAGEAL REFLUX DISEASE, UNSPECIFIED WHETHER ESOPHAGITIS PRESENT: ICD-10-CM

## 2024-06-27 RX ORDER — PANTOPRAZOLE SODIUM 20 MG/1
20 TABLET, DELAYED RELEASE ORAL
Qty: 30 TABLET | Refills: 0 | Status: SHIPPED | OUTPATIENT
Start: 2024-06-27

## 2024-06-27 NOTE — TELEPHONE ENCOUNTER
Requesting    Name from pharmacy: PANTOPRAZOLE SOD DR 20 MG TAB         Will file in chart as: PANTOPRAZOLE 20 MG Oral Tab EC    Sig: Take 1 tablet (20 mg total) by mouth every morning before breakfast.    Disp: 30 tablet    Refills: 0 (Pharmacy requested: Not specified)    Start: 6/26/2024    Class: Normal    Non-formulary For: Gastroesophageal reflux disease, unspecified whether esophagitis present    Last ordered: 4 weeks ago (5/30/2024) by Arlin Doan MD    Last refill: 5/30/2024    Rx #: [id number]    Gastrointestional Medication Protocol [id number]/26/2024 12:16 AM   Protocol Details In person appointment or virtual visit in the past 12 mos or appointment in next 3 mos      To be filled at: [id number] - [place], [place] - [address] [place] [facility] [place] [phone], [phone]     LOV: 02/19/24 w/ DV  RTC: 02/19/25  Last Relevant Labs: 02/19/24  Filled: 05/30/24 #30 with 0 refills    No future appointments.

## 2024-09-20 ENCOUNTER — LAB ENCOUNTER (OUTPATIENT)
Dept: LAB | Age: 54
End: 2024-09-20
Attending: INTERNAL MEDICINE
Payer: COMMERCIAL

## 2024-09-20 DIAGNOSIS — Z00.00 ENCOUNTER FOR PREVENTATIVE ADULT HEALTH CARE EXAMINATION: ICD-10-CM

## 2024-09-20 DIAGNOSIS — Z12.5 SCREENING FOR MALIGNANT NEOPLASM OF PROSTATE: ICD-10-CM

## 2024-09-20 DIAGNOSIS — I10 PRIMARY HYPERTENSION: Chronic | ICD-10-CM

## 2024-09-20 DIAGNOSIS — Z12.11 SCREENING FOR MALIGNANT NEOPLASM OF COLON: ICD-10-CM

## 2024-09-20 LAB — HEMOCCULT STL QL: NEGATIVE

## 2024-09-20 PROCEDURE — 82274 ASSAY TEST FOR BLOOD FECAL: CPT

## 2024-09-28 ENCOUNTER — LAB ENCOUNTER (OUTPATIENT)
Dept: LAB | Age: 54
End: 2024-09-28
Attending: INTERNAL MEDICINE
Payer: COMMERCIAL

## 2024-09-28 DIAGNOSIS — D64.9 NORMOCYTIC ANEMIA: ICD-10-CM

## 2024-09-28 LAB
BASOPHILS # BLD AUTO: 0.04 X10(3) UL (ref 0–0.2)
BASOPHILS NFR BLD AUTO: 0.7 %
EOSINOPHIL # BLD AUTO: 0.16 X10(3) UL (ref 0–0.7)
EOSINOPHIL NFR BLD AUTO: 2.9 %
ERYTHROCYTE [DISTWIDTH] IN BLOOD BY AUTOMATED COUNT: 13.7 %
HCT VFR BLD AUTO: 42.4 %
HGB BLD-MCNC: 14 G/DL
IMM GRANULOCYTES # BLD AUTO: 0.02 X10(3) UL (ref 0–1)
IMM GRANULOCYTES NFR BLD: 0.4 %
LYMPHOCYTES # BLD AUTO: 2.21 X10(3) UL (ref 1–4)
LYMPHOCYTES NFR BLD AUTO: 39.6 %
MCH RBC QN AUTO: 30.8 PG (ref 26–34)
MCHC RBC AUTO-ENTMCNC: 33 G/DL (ref 31–37)
MCV RBC AUTO: 93.2 FL
MONOCYTES # BLD AUTO: 0.55 X10(3) UL (ref 0.1–1)
MONOCYTES NFR BLD AUTO: 9.9 %
NEUTROPHILS # BLD AUTO: 2.6 X10 (3) UL (ref 1.5–7.7)
NEUTROPHILS # BLD AUTO: 2.6 X10(3) UL (ref 1.5–7.7)
NEUTROPHILS NFR BLD AUTO: 46.5 %
PLATELET # BLD AUTO: 253 10(3)UL (ref 150–450)
RBC # BLD AUTO: 4.55 X10(6)UL
WBC # BLD AUTO: 5.6 X10(3) UL (ref 4–11)

## 2024-09-28 PROCEDURE — 85025 COMPLETE CBC W/AUTO DIFF WBC: CPT | Performed by: INTERNAL MEDICINE

## 2025-01-04 ENCOUNTER — OFFICE VISIT (OUTPATIENT)
Dept: FAMILY MEDICINE CLINIC | Facility: CLINIC | Age: 55
End: 2025-01-04
Payer: COMMERCIAL

## 2025-01-04 VITALS
WEIGHT: 266 LBS | HEART RATE: 66 BPM | DIASTOLIC BLOOD PRESSURE: 98 MMHG | TEMPERATURE: 98 F | RESPIRATION RATE: 16 BRPM | HEIGHT: 72 IN | BODY MASS INDEX: 36.03 KG/M2 | OXYGEN SATURATION: 97 % | SYSTOLIC BLOOD PRESSURE: 158 MMHG

## 2025-01-04 DIAGNOSIS — J01.00 ACUTE NON-RECURRENT MAXILLARY SINUSITIS: Primary | ICD-10-CM

## 2025-01-04 PROCEDURE — 99213 OFFICE O/P EST LOW 20 MIN: CPT | Performed by: NURSE PRACTITIONER

## 2025-01-04 PROCEDURE — 3080F DIAST BP >= 90 MM HG: CPT | Performed by: NURSE PRACTITIONER

## 2025-01-04 PROCEDURE — 3077F SYST BP >= 140 MM HG: CPT | Performed by: NURSE PRACTITIONER

## 2025-01-04 PROCEDURE — 3008F BODY MASS INDEX DOCD: CPT | Performed by: NURSE PRACTITIONER

## 2025-01-04 NOTE — PROGRESS NOTES
CHIEF COMPLAINT:     Chief Complaint   Patient presents with    Sinus Problem     Stuffy nose, sinus pain, PND causing cough x 3 wks   No recent Covid test  OTC Nyquil, Dayquil  Denies fever         HPI:   Chavez Gallardo is a 54 year old male who presents for cold symptoms for  3  weeks. Symptoms have progressed into sinus congestion and been worsening since onset. Sinus congestion/pain is described as a pressure and is located mainly in maxillary sinuses.  Patient also reports congestion, sinus pain, prior history of sinusitis. denies dental pain. Has treated symptoms with Nasal decongestant and OTC cold meds.       Current Outpatient Medications   Medication Sig Dispense Refill    amoxicillin clavulanate 875-125 MG Oral Tab Take 1 tablet by mouth 2 (two) times daily for 7 days. 14 tablet 0    PANTOPRAZOLE 20 MG Oral Tab EC TAKE 1 TABLET (20 MG TOTAL) BY MOUTH EVERY MORNING BEFORE BREAKFAST. 30 tablet 0    lisinopril 20 MG Oral Tab Take 1 tablet (20 mg total) by mouth daily. 90 tablet 3    benzonatate 200 MG Oral Cap Take 1 capsule (200 mg total) by mouth 3 (three) times daily as needed for cough. Take with a full glass of water and DO NOT CHEW. 20 capsule 0    Tadalafil 10 MG Oral Tab Take 1 tablet (10 mg total) by mouth daily as needed.        Past Medical History:    Anxiety    Arthritis    Knee scope arthitis removed    Esophageal reflux    Comes and goes. Occurs with stress too    Lipid screening    OBESITY    Obesity    Weight always yoyos now is heavy time      Past Surgical History:   Procedure Laterality Date    Arthroscopy of joint unlisted Right       Family History   Problem Relation Age of Onset    Cancer Mother         Hodgkin's    Other (CHF) Mother     Other (hodgkin's disease) Mother     Obesity Mother     Heart Disorder Father     Other (valvular heart disease) Father     Depression Father     Hypertension Father     Psychiatric Father     Pulmonary Disease Father     Other (chronic liver disease)  Maternal Grandfather     Other (lung cancer) Maternal Grandmother       Social History     Socioeconomic History    Marital status:     Number of children: 2   Occupational History    Occupation:    Tobacco Use    Smoking status: Former     Current packs/day: 0.00     Average packs/day: 1.5 packs/day for 2.0 years (3.0 ttl pk-yrs)     Types: Cigarettes     Start date: 1997     Quit date: 1999     Years since quittin.0    Smokeless tobacco: Never   Vaping Use    Vaping status: Never Used   Substance and Sexual Activity    Alcohol use: Yes     Alcohol/week: 30.0 standard drinks of alcohol     Types: 30 Standard drinks or equivalent per week     Comment: occasional    Drug use: No   Other Topics Concern    Caffeine Concern No    Stress Concern Yes    Weight Concern Yes    Exercise No    Seat Belt Yes   Social History Narrative    ** Merged History Encounter **          Social Drivers of Health      [facility]    Housing Stability         REVIEW OF SYSTEMS:   GENERAL:  denies  diminished appetite  SKIN: no rashes or abnormal skin lesions  HEENT: See HPI.    LUNGS: denies shortness of breath or wheezing, See HPI  CARDIOVASCULAR: denies chest pain or palpitations   GI: denies N/V/C or abdominal pain  NEURO: + sinus headaches.  No numbness or tingling in face.    EXAM:   BP (!) 158/98   Pulse 66   Temp 97.8 °F (36.6 °C) (Temporal)   Resp 16   Ht 6' (1.829 m)   Wt 266 lb (120.7 kg)   SpO2 97%   BMI 36.08 kg/m²   GENERAL: well developed, well nourished,in no apparent distress  SKIN: no rashes,no suspicious lesions  HEAD: atraumatic, normocephalic, + tenderness on palpation of maxillary sinuses  EYES: conjunctiva clear, EOM intact  EARS: TM's without erythema, no bulging, no retraction, no fluid, bony landmarks visible  NOSE: nostrils patent, clear nasal mucous, nasal mucosa reddened and inflamed  THROAT: oral mucosa pink,  moist. No visible dental caries. Posterior pharynx is not erythematous.  NECK: supple, non-tender  LUNGS: Breathing is non labored. Lungs clear to auscultation bilaterally, no wheezes or rhonchi.   CARDIO: RRR without murmur  EXTREMITIES: no cyanosis, clubbing or edema  LYMPH:  no cervical lymphadenopathy.        ASSESSMENT AND PLAN:   Chavez Gallardo is a 54 year old male who presents with URI symptoms that are consistent with:      ASSESSMENT:  Encounter Diagnosis   Name Primary?    Acute non-recurrent maxillary sinusitis Yes         PLAN: Meds as below.  Comfort care instructions as listed in Patient Instructions    Meds & Refills for this Visit:  Requested Prescriptions     Signed Prescriptions Disp Refills    amoxicillin clavulanate 875-125 MG Oral Tab 14 tablet 0     Sig: Take 1 tablet by mouth 2 (two) times daily for 7 days.       Risks, benefits, side effects of medication addressed and explained.    Patient Instructions   I recommend the 4 H's for inflammation:    1. Heat (warm mist from the shower or warm liquids such as tea)  2. Honey (mixed in your tea or by the spoonful [if you are not diabetic; over the age of 1 year]--take a spoonful 3 times a day and don't eat or drink anything for 15-20 minutes)  3. Humidity--cool mist in the bedroom at night  4. Hydration --at least 8 -10 glasses a day     Flonase daily  Benadryl at night    The patient indicates understanding of these issues and agrees to the plan.  The patient is asked to f/u with PCP if sx's persist or worsen.

## 2025-01-28 DIAGNOSIS — I10 PRIMARY HYPERTENSION: Chronic | ICD-10-CM

## 2025-01-28 RX ORDER — LISINOPRIL 20 MG/1
20 TABLET ORAL DAILY
Qty: 90 TABLET | Refills: 0 | Status: SHIPPED | OUTPATIENT
Start: 2025-01-28

## 2025-01-28 NOTE — TELEPHONE ENCOUNTER
LOV: 2/19/2024 with Dr. Doan   RTC: 12 months  Last Relevant Labs: 9/28/2024 (CBC)  Filled: 2/19/2024    #90 with 3 refills    No future appointments.

## 2025-03-07 ENCOUNTER — LAB ENCOUNTER (OUTPATIENT)
Dept: LAB | Age: 55
End: 2025-03-07
Attending: INTERNAL MEDICINE
Payer: COMMERCIAL

## 2025-03-07 ENCOUNTER — OFFICE VISIT (OUTPATIENT)
Dept: INTERNAL MEDICINE CLINIC | Facility: CLINIC | Age: 55
End: 2025-03-07
Payer: COMMERCIAL

## 2025-03-07 VITALS
DIASTOLIC BLOOD PRESSURE: 64 MMHG | WEIGHT: 252.63 LBS | OXYGEN SATURATION: 99 % | HEART RATE: 71 BPM | BODY MASS INDEX: 35.37 KG/M2 | RESPIRATION RATE: 16 BRPM | TEMPERATURE: 97 F | HEIGHT: 71 IN | SYSTOLIC BLOOD PRESSURE: 126 MMHG

## 2025-03-07 DIAGNOSIS — K21.9 GASTROESOPHAGEAL REFLUX DISEASE, UNSPECIFIED WHETHER ESOPHAGITIS PRESENT: ICD-10-CM

## 2025-03-07 DIAGNOSIS — R20.2 PARESTHESIA OF UPPER EXTREMITY: ICD-10-CM

## 2025-03-07 DIAGNOSIS — R73.09 ELEVATED HEMOGLOBIN A1C: ICD-10-CM

## 2025-03-07 DIAGNOSIS — I10 PRIMARY HYPERTENSION: Chronic | ICD-10-CM

## 2025-03-07 DIAGNOSIS — Z00.00 ENCOUNTER FOR PREVENTATIVE ADULT HEALTH CARE EXAMINATION: Primary | ICD-10-CM

## 2025-03-07 DIAGNOSIS — Z23 NEED FOR SHINGLES VACCINE: ICD-10-CM

## 2025-03-07 DIAGNOSIS — Z00.00 ENCOUNTER FOR PREVENTATIVE ADULT HEALTH CARE EXAMINATION: ICD-10-CM

## 2025-03-07 DIAGNOSIS — Z82.49 FAMILY HISTORY OF CORONARY ARTERY DISEASE IN FATHER: ICD-10-CM

## 2025-03-07 LAB
ALBUMIN SERPL-MCNC: 5 G/DL (ref 3.2–4.8)
ALBUMIN/GLOB SERPL: 1.9 {RATIO} (ref 1–2)
ALP LIVER SERPL-CCNC: 57 U/L
ALT SERPL-CCNC: 21 U/L
ANION GAP SERPL CALC-SCNC: 10 MMOL/L (ref 0–18)
AST SERPL-CCNC: 28 U/L (ref ?–34)
BASOPHILS # BLD AUTO: 0.05 X10(3) UL (ref 0–0.2)
BASOPHILS NFR BLD AUTO: 1.2 %
BILIRUB SERPL-MCNC: 0.6 MG/DL (ref 0.3–1.2)
BUN BLD-MCNC: 11 MG/DL (ref 9–23)
CALCIUM BLD-MCNC: 10.2 MG/DL (ref 8.7–10.6)
CHLORIDE SERPL-SCNC: 105 MMOL/L (ref 98–112)
CHOLEST SERPL-MCNC: 177 MG/DL (ref ?–200)
CO2 SERPL-SCNC: 28 MMOL/L (ref 21–32)
COMPLEXED PSA SERPL-MCNC: 0.95 NG/ML (ref ?–4)
CREAT BLD-MCNC: 1.03 MG/DL
EGFRCR SERPLBLD CKD-EPI 2021: 87 ML/MIN/1.73M2 (ref 60–?)
EOSINOPHIL # BLD AUTO: 0.08 X10(3) UL (ref 0–0.7)
EOSINOPHIL NFR BLD AUTO: 1.8 %
ERYTHROCYTE [DISTWIDTH] IN BLOOD BY AUTOMATED COUNT: 15.8 %
EST. AVERAGE GLUCOSE BLD GHB EST-MCNC: 111 MG/DL (ref 68–126)
FASTING PATIENT LIPID ANSWER: YES
FASTING STATUS PATIENT QL REPORTED: YES
GLOBULIN PLAS-MCNC: 2.6 G/DL (ref 2–3.5)
GLUCOSE BLD-MCNC: 91 MG/DL (ref 70–99)
HBA1C MFR BLD: 5.5 % (ref ?–5.7)
HCT VFR BLD AUTO: 43.7 %
HDLC SERPL-MCNC: 56 MG/DL (ref 40–59)
HGB BLD-MCNC: 14.4 G/DL
IMM GRANULOCYTES # BLD AUTO: 0.01 X10(3) UL (ref 0–1)
IMM GRANULOCYTES NFR BLD: 0.2 %
LDLC SERPL CALC-MCNC: 112 MG/DL (ref ?–100)
LYMPHOCYTES # BLD AUTO: 2.03 X10(3) UL (ref 1–4)
LYMPHOCYTES NFR BLD AUTO: 46.9 %
MCH RBC QN AUTO: 29.6 PG (ref 26–34)
MCHC RBC AUTO-ENTMCNC: 33 G/DL (ref 31–37)
MCV RBC AUTO: 89.7 FL
MONOCYTES # BLD AUTO: 0.41 X10(3) UL (ref 0.1–1)
MONOCYTES NFR BLD AUTO: 9.5 %
NEUTROPHILS # BLD AUTO: 1.75 X10 (3) UL (ref 1.5–7.7)
NEUTROPHILS # BLD AUTO: 1.75 X10(3) UL (ref 1.5–7.7)
NEUTROPHILS NFR BLD AUTO: 40.4 %
NONHDLC SERPL-MCNC: 121 MG/DL (ref ?–130)
OSMOLALITY SERPL CALC.SUM OF ELEC: 295 MOSM/KG (ref 275–295)
PLATELET # BLD AUTO: 239 10(3)UL (ref 150–450)
POTASSIUM SERPL-SCNC: 4.6 MMOL/L (ref 3.5–5.1)
PROT SERPL-MCNC: 7.6 G/DL (ref 5.7–8.2)
RBC # BLD AUTO: 4.87 X10(6)UL
SODIUM SERPL-SCNC: 143 MMOL/L (ref 136–145)
TRIGL SERPL-MCNC: 47 MG/DL (ref 30–149)
VLDLC SERPL CALC-MCNC: 8 MG/DL (ref 0–30)
WBC # BLD AUTO: 4.3 X10(3) UL (ref 4–11)

## 2025-03-07 PROCEDURE — 83036 HEMOGLOBIN GLYCOSYLATED A1C: CPT

## 2025-03-07 PROCEDURE — 36415 COLL VENOUS BLD VENIPUNCTURE: CPT

## 2025-03-07 PROCEDURE — 80061 LIPID PANEL: CPT

## 2025-03-07 PROCEDURE — 80053 COMPREHEN METABOLIC PANEL: CPT

## 2025-03-07 PROCEDURE — 85025 COMPLETE CBC W/AUTO DIFF WBC: CPT

## 2025-03-07 NOTE — PROGRESS NOTES
Chavez Gallardo is a 54 year old male.   HPI:     Chief Complaint   Patient presents with    Physical     Patient presents for CPX/wellness examination.  Diet:  Exercise: Going to the gym (45 minutes or more) 3 days a week  Vision: Just reading glasses - no recent eye exam  Dental: Up to date    Acute issues:  Donates blood - has been refused a few times due to slightly low hemoglobin.  No melena or hematochezia. Starting taking OTC iron.  While lifting weights - was noticing some paresthesias in his hands.  This resolved.      Chronic issues:  Hypertension - at goal blood pressure.  Skips lisinopril sometimes based on BP readings - some orthostatic symptoms at times if he takes the lisinopril every single day.  GERD - on and off - worst at nights, then the next day some GERD related pains.    Past medical, family, surgical and social history were reviewed as listed in the chart, and are unchanged from previous visit.  REVIEW OF SYSTEMS:   GENERAL/ const: no fevers/chills, no unintentional weight loss  SKIN: denies any unusual skin lesions  EYES:no vision problems  HEENT: denies sinus pain or sinus tenderness  LUNGS: denies shortness of breath   CARDIOVASCULAR: denies chest pain  GI: GERD  : denies dysuria   MUSCULOSKELETAL: no acute arthralgias  NEURO: paresthesias, resolved; denies headaches  PSYCHIATRIC: denies issues  ENDOCRINE: no hot/cold intolerance  ALLERGY: Allergies[1]  PAST HISTORY:     Current Outpatient Medications:     lisinopril 20 MG Oral Tab, Take 1 tablet (20 mg total) by mouth daily. Due for office visit, Disp: 90 tablet, Rfl: 0    Tadalafil 10 MG Oral Tab, Take 1 tablet (10 mg total) by mouth daily as needed., Disp: , Rfl:   Medical:  has a past medical history of Anxiety (Two years), Arthritis (11 01 2016), Esophageal reflux (Two years), Lipid screening (07/28/2011), OBESITY, and Obesity (51 yrs).  Surgical:  has a past surgical history that includes arthroscopy of joint unlisted  (Right).  Family: family history includes CHF in his mother; Cancer in his mother; Depression in his father; Heart Disorder in his father; Hypertension in his father; Obesity in his mother; Psychiatric in his father; Pulmonary Disease in his father; chronic liver disease in his maternal grandfather; hodgkin's disease in his mother; lung cancer in his maternal grandmother; valvular heart disease in his father.  Social:  reports that he quit smoking about 26 years ago. His smoking use included cigarettes. He started smoking about 28 years ago. He has a 3 pack-year smoking history. He has never used smokeless tobacco. He reports current alcohol use of about 30.0 standard drinks of alcohol per week. He reports that he does not use drugs.  Wt Readings from Last 6 Encounters:   03/07/25 252 lb 9.6 oz (114.6 kg)   01/04/25 266 lb (120.7 kg)   02/19/24 278 lb (126.1 kg)   02/10/24 279 lb (126.6 kg)   02/14/23 278 lb 9.6 oz (126.4 kg)   08/03/22 282 lb 6.4 oz (128.1 kg)     EXAM:   /64   Pulse 71   Temp 97.1 °F (36.2 °C) (Temporal)   Resp 16   Ht 5' 11\" (1.803 m)   Wt 252 lb 9.6 oz (114.6 kg)   SpO2 99%   BMI 35.23 kg/m²   GENERAL: Alert and oriented, well developed, well nourished,in no apparent distress  SKIN: no rashes,no suspicious lesions  HEENT: atraumatic, PERRLA, EOMI, normal lid and conjunctiva, normal external canals and tympanic membranes bilaterally  NECK: supple, no jvd, no thyromegaly, no palpable/tender cervical lymphadenopathy  LUNGS: clear to auscultation bilaterally, no wheezing/rubs  CARDIO: RRR without murmurs.  No clubbing, cyanosis or edema.  GI: soft non tender nondistended no hepatosplenomegaly, bowel sounds throughout  NEURO: CN II-XII intact, 5/5 strength all extremities  MS: Full ROM  PSYCH: pleasant, appropriate mood and affect  ASSESSMENT AND PLAN:   1.  Encounter for preventative adult health examination  2. Need for shingles vaccine  Age appropriate health guidance and counseling  provided.  Screening labs done this morning, results pending.  Body mass index is 35.23 kg/m².  Normal Cologuard in December 2024.  Declines Shingrix.    3. Primary hypertension  At goal blood pressure.  Some orthostatic symptoms if he takes lisinopril all the time, sometimes skips based on premedication reading.  Metabolic panel pending.  Will continue lisinopril 20 mg every day for now, may titrate down if he continues to have more orthostatic episodes.    4. Paresthesia of upper extremity  Some paresthesias in upper extremities for 2-3 weeks when he started lifting weight earlier.  Resolved now.  Full strength/ROM.  Will check XR cervical spine - may refer to NSGY if x-ray abnormal or symptoms recur.  - XR CERVICAL SPINE (2-3 VIEWS) (CPT=72040); Future    5. Gastroesophageal reflux disease, unspecified whether esophagitis present  Worsening GERD of late.  He gets home late from work so hard for him to eat early dinner.  Recommend he try nightly famotidine or cimetidine.  If that does not help could do another short course of pantoprazole.    Patient Care Team:  Arlin Doan MD as PCP - General (Internal Medicine)  Hesham Huang MD as Consulting Physician (Surgery, Orthopaedic)  Fern Nix MD as Consulting Physician (Cardiovascular Diseases)  The patient indicates understanding of these issues and agrees to the plan.  The patient is asked to return to clinic in 12 months for follow up on chronic issues/CPX, or earlier if acute issues arise.    Arlin Doan MD             [1] No Known Allergies

## 2025-03-07 NOTE — PATIENT INSTRUCTIONS
- Blood pressure looks good  - Schedule x-ray of cervical spine (upper neck/spine) to look into your numbness/tingling symptoms.  Can schedule through [facility] or call central scheduling at [phone]  - Will send [facility] message about lab results likely tomorrow  - Try over the counter GERD medications famotidine or cimetidine nightly; if that does not help your symptoms let us know and we may do another short course of pantoprazole.  - Follow up in 1 year for physical/chronic issues; follow up earlier as needed.    It was a pleasure seeing you in the clinic today.  Thank you for choosing the [facility] office for your healthcare needs. Please call at [phone] with any questions or concerns.    Arlin Doan MD

## 2025-03-14 ENCOUNTER — PATIENT MESSAGE (OUTPATIENT)
Dept: INTERNAL MEDICINE CLINIC | Facility: CLINIC | Age: 55
End: 2025-03-14

## 2025-04-03 ENCOUNTER — LAB ENCOUNTER (OUTPATIENT)
Dept: LAB | Age: 55
End: 2025-04-03
Attending: INTERNAL MEDICINE
Payer: COMMERCIAL

## 2025-04-03 DIAGNOSIS — Z01.84 IMMUNITY STATUS TESTING: ICD-10-CM

## 2025-04-03 LAB — RUBV IGG SER-ACNC: 6 IU/ML (ref 10–?)

## 2025-04-03 PROCEDURE — 86735 MUMPS ANTIBODY: CPT | Performed by: INTERNAL MEDICINE

## 2025-04-03 PROCEDURE — 86762 RUBELLA ANTIBODY: CPT | Performed by: INTERNAL MEDICINE

## 2025-04-03 PROCEDURE — 86765 RUBEOLA ANTIBODY: CPT | Performed by: INTERNAL MEDICINE
